# Patient Record
Sex: MALE | Race: OTHER | URBAN - METROPOLITAN AREA
[De-identification: names, ages, dates, MRNs, and addresses within clinical notes are randomized per-mention and may not be internally consistent; named-entity substitution may affect disease eponyms.]

---

## 2021-01-25 ENCOUNTER — INPATIENT (INPATIENT)
Facility: HOSPITAL | Age: 86
LOS: 3 days | Discharge: HOME | End: 2021-01-29
Attending: HOSPITALIST | Admitting: HOSPITALIST
Payer: MEDICARE

## 2021-01-25 VITALS
DIASTOLIC BLOOD PRESSURE: 59 MMHG | RESPIRATION RATE: 17 BRPM | OXYGEN SATURATION: 99 % | HEART RATE: 135 BPM | TEMPERATURE: 97 F | SYSTOLIC BLOOD PRESSURE: 120 MMHG

## 2021-01-25 LAB
ABO RH CONFIRMATION: SIGNIFICANT CHANGE UP
ALBUMIN SERPL ELPH-MCNC: 3.5 G/DL — SIGNIFICANT CHANGE UP (ref 3.5–5.2)
ALP SERPL-CCNC: 58 U/L — SIGNIFICANT CHANGE UP (ref 30–115)
ALT FLD-CCNC: 8 U/L — SIGNIFICANT CHANGE UP (ref 0–41)
ANION GAP SERPL CALC-SCNC: 21 MMOL/L — HIGH (ref 7–14)
ANISOCYTOSIS BLD QL: SIGNIFICANT CHANGE UP
AST SERPL-CCNC: 21 U/L — SIGNIFICANT CHANGE UP (ref 0–41)
BASOPHILS # BLD AUTO: 0 K/UL — SIGNIFICANT CHANGE UP (ref 0–0.2)
BASOPHILS NFR BLD AUTO: 0 % — SIGNIFICANT CHANGE UP (ref 0–1)
BILIRUB SERPL-MCNC: 0.2 MG/DL — SIGNIFICANT CHANGE UP (ref 0.2–1.2)
BLD GP AB SCN SERPL QL: SIGNIFICANT CHANGE UP
BUN SERPL-MCNC: 63 MG/DL — CRITICAL HIGH (ref 10–20)
CALCIUM SERPL-MCNC: 8.7 MG/DL — SIGNIFICANT CHANGE UP (ref 8.5–10.1)
CHLORIDE SERPL-SCNC: 107 MMOL/L — SIGNIFICANT CHANGE UP (ref 98–110)
CO2 SERPL-SCNC: 15 MMOL/L — LOW (ref 17–32)
CREAT SERPL-MCNC: 2.2 MG/DL — HIGH (ref 0.7–1.5)
EOSINOPHIL # BLD AUTO: 0.14 K/UL — SIGNIFICANT CHANGE UP (ref 0–0.7)
EOSINOPHIL NFR BLD AUTO: 0.8 % — SIGNIFICANT CHANGE UP (ref 0–8)
GAS PNL BLDA: SIGNIFICANT CHANGE UP
GIANT PLATELETS BLD QL SMEAR: PRESENT — SIGNIFICANT CHANGE UP
GLUCOSE SERPL-MCNC: 226 MG/DL — HIGH (ref 70–99)
HCT VFR BLD CALC: 14.1 % — LOW (ref 42–52)
HGB BLD-MCNC: 4.2 G/DL — CRITICAL LOW (ref 14–18)
HYPOCHROMIA BLD QL: SLIGHT — SIGNIFICANT CHANGE UP
LYMPHOCYTES # BLD AUTO: 1.55 K/UL — SIGNIFICANT CHANGE UP (ref 1.2–3.4)
LYMPHOCYTES # BLD AUTO: 8.7 % — LOW (ref 20.5–51.1)
MACROCYTES BLD QL: SIGNIFICANT CHANGE UP
MANUAL SMEAR VERIFICATION: SIGNIFICANT CHANGE UP
MCHC RBC-ENTMCNC: 24.6 PG — LOW (ref 27–31)
MCHC RBC-ENTMCNC: 29.8 G/DL — LOW (ref 32–37)
MCV RBC AUTO: 82.5 FL — SIGNIFICANT CHANGE UP (ref 80–94)
MONOCYTES # BLD AUTO: 0.71 K/UL — HIGH (ref 0.1–0.6)
MONOCYTES NFR BLD AUTO: 4 % — SIGNIFICANT CHANGE UP (ref 1.7–9.3)
NEUTROPHILS # BLD AUTO: 15.27 K/UL — HIGH (ref 1.4–6.5)
NEUTROPHILS NFR BLD AUTO: 85.7 % — HIGH (ref 42.2–75.2)
NT-PROBNP SERPL-SCNC: 3274 PG/ML — HIGH (ref 0–300)
PLAT MORPH BLD: NORMAL — SIGNIFICANT CHANGE UP
PLATELET # BLD AUTO: 249 K/UL — SIGNIFICANT CHANGE UP (ref 130–400)
POIKILOCYTOSIS BLD QL AUTO: SIGNIFICANT CHANGE UP
POLYCHROMASIA BLD QL SMEAR: SIGNIFICANT CHANGE UP
POTASSIUM SERPL-MCNC: 4.5 MMOL/L — SIGNIFICANT CHANGE UP (ref 3.5–5)
POTASSIUM SERPL-SCNC: 4.5 MMOL/L — SIGNIFICANT CHANGE UP (ref 3.5–5)
PROT SERPL-MCNC: 5.2 G/DL — LOW (ref 6–8)
RAPID RVP RESULT: SIGNIFICANT CHANGE UP
RBC # BLD: 1.71 M/UL — LOW (ref 4.7–6.1)
RBC # FLD: 19.6 % — HIGH (ref 11.5–14.5)
RBC BLD AUTO: SIGNIFICANT CHANGE UP
SARS-COV-2 RNA SPEC QL NAA+PROBE: SIGNIFICANT CHANGE UP
SODIUM SERPL-SCNC: 143 MMOL/L — SIGNIFICANT CHANGE UP (ref 135–146)
TROPONIN T SERPL-MCNC: 0.05 NG/ML — CRITICAL HIGH
VARIANT LYMPHS # BLD: 0.8 % — SIGNIFICANT CHANGE UP (ref 0–5)
WBC # BLD: 17.82 K/UL — HIGH (ref 4.8–10.8)
WBC # FLD AUTO: 17.82 K/UL — HIGH (ref 4.8–10.8)

## 2021-01-25 PROCEDURE — 76937 US GUIDE VASCULAR ACCESS: CPT | Mod: 26,GC

## 2021-01-25 PROCEDURE — 36000 PLACE NEEDLE IN VEIN: CPT | Mod: GC

## 2021-01-25 PROCEDURE — 93010 ELECTROCARDIOGRAM REPORT: CPT

## 2021-01-25 PROCEDURE — 71045 X-RAY EXAM CHEST 1 VIEW: CPT | Mod: 26

## 2021-01-25 PROCEDURE — 99285 EMERGENCY DEPT VISIT HI MDM: CPT | Mod: 25,GC

## 2021-01-25 RX ORDER — SODIUM CHLORIDE 9 MG/ML
1000 INJECTION, SOLUTION INTRAVENOUS ONCE
Refills: 0 | Status: COMPLETED | OUTPATIENT
Start: 2021-01-25 | End: 2021-01-25

## 2021-01-25 RX ADMIN — SODIUM CHLORIDE 1000 MILLILITER(S): 9 INJECTION, SOLUTION INTRAVENOUS at 16:20

## 2021-01-25 NOTE — ED PROVIDER NOTE - OBJECTIVE STATEMENT
Pt is a 88 yo M with PMH DM, HTN who was BIBEMS for reported SOB.  Per EMS, pt hypoxic 80s.  Non rebreather 80s. EMS was called by family who could not get in contact with him and would not open the door.  Pt lives alone.  Daughter Jocelyn said she last had a conversation with him 5 days ago.  He may be more confused now but pt baseline A&Ox 2. Pt denies any chest pain, headache, abdominal pain, n/v/d, BRBPR, melena, hematemesis, cough.

## 2021-01-25 NOTE — ED PROVIDER NOTE - ATTENDING CONTRIBUTION TO CARE
89 yo M 89 yo M bibems, found hypoxic to 80's. call was for SOB.  lives home alone.    alert, but expressing himself. cannot state why he is here, dec BS bilsarath, abd soft.  a/p: labs, imaging, reassess

## 2021-01-25 NOTE — ED PROVIDER NOTE - NS ED ROS FT
Review of Systems:  CONSTITUTIONAL: No fever, No diaphoresis  SKIN: No rash  HEMATOLOGIC: No abnormal bleeding or bruising  ENT: No sore throat, No neck pain  RESPIRATORY: + shortness of breath, No cough  CARDIAC: No chest pain, No palpitations  GI: No abdominal pain, No nausea, No vomiting, No diarrhea, No constipation, No bright red blood per rectum or melena  MUSCULOSKELETAL: No joint paint, No swelling, No back pain  NEUROLOGIC: No numbness, No focal weakness, No headache, No dizziness  All other systems negative, unless specified in HPI

## 2021-01-25 NOTE — ED PROVIDER NOTE - PROGRESS NOTE DETAILS
Authored by Dr. Jamison: blood gas noted. low h/h, transfuse Authored by Dr. Jamison: s/o to dr gilbert, f/u labs, imaging  and dispo AH - spoke to daughter Jocelyn [phone], for collateral, see HPI pt with profound anemia, family notified, 1 large dark BM in ED  bp and HR unchanged, pt had CT head and CT abd, awaiting result and admission  blood transfusion initiated. Pt resting comfortably in stretcher. Cont to have tachycardia. Blood transfusion started. Will cont to monitor. Discussed with GI. Planned to scope in AM. Janay todd admitted for further management and care

## 2021-01-25 NOTE — ED ADULT TRIAGE NOTE - CHIEF COMPLAINT QUOTE
Pt BIBA complaining of abdominal pain. As per EMS hypoxic on scene. 02 sat 78 on room air. 02 sat in triage 99. Pt bradycardic in triage, HR 35. ekg done in triage, Pt BIBA complaining of abdominal pain. As per EMS hypoxic on scene. 02 sat 78 on room air. 02 sat in triage 99. EKG done in triage.SVT noted.

## 2021-01-25 NOTE — ED ADULT TRIAGE NOTE - NS ED NURSE AMBULANCES
----- Message from Neha Bravo MD sent at 3/27/2017  4:50 PM CDT -----  Please inform flu test is neg   [facility]

## 2021-01-25 NOTE — ED ADULT NURSE NOTE - CHIEF COMPLAINT QUOTE
Pt BIBA complaining of abdominal pain. As per EMS hypoxic on scene. 02 sat 78 on room air. 02 sat in triage 99. EKG done in triage.SVT noted.

## 2021-01-25 NOTE — ED PROVIDER NOTE - PHYSICAL EXAMINATION
CONSTITUTIONAL: in no acute distress  SKIN: Warm, dry  EYES: No conjunctival injection. EOMI  ENT: airway clear  CARD:  tachycardic, regular  RESP: mild crackles bases b/l  ABD: Soft NTND  RECTAL: nontender, no masses or fissures, no blood  EXT: Normal ROM. No edema  NEURO: A&O x1, grossly unremarkable, no focal deficits

## 2021-01-26 LAB
ALBUMIN SERPL ELPH-MCNC: 3.4 G/DL — LOW (ref 3.5–5.2)
ALP SERPL-CCNC: 60 U/L — SIGNIFICANT CHANGE UP (ref 30–115)
ALT FLD-CCNC: 9 U/L — SIGNIFICANT CHANGE UP (ref 0–41)
ANION GAP SERPL CALC-SCNC: 10 MMOL/L — SIGNIFICANT CHANGE UP (ref 7–14)
APTT BLD: 23.8 SEC — CRITICAL LOW (ref 27–39.2)
APTT BLD: 24.3 SEC — LOW (ref 27–39.2)
AST SERPL-CCNC: 20 U/L — SIGNIFICANT CHANGE UP (ref 0–41)
BASOPHILS # BLD AUTO: 0.03 K/UL — SIGNIFICANT CHANGE UP (ref 0–0.2)
BASOPHILS NFR BLD AUTO: 0.2 % — SIGNIFICANT CHANGE UP (ref 0–1)
BILIRUB SERPL-MCNC: 1.4 MG/DL — HIGH (ref 0.2–1.2)
BUN SERPL-MCNC: 49 MG/DL — HIGH (ref 10–20)
CALCIUM SERPL-MCNC: 8.8 MG/DL — SIGNIFICANT CHANGE UP (ref 8.5–10.1)
CHLORIDE SERPL-SCNC: 112 MMOL/L — HIGH (ref 98–110)
CO2 SERPL-SCNC: 23 MMOL/L — SIGNIFICANT CHANGE UP (ref 17–32)
CREAT SERPL-MCNC: 1.9 MG/DL — HIGH (ref 0.7–1.5)
EOSINOPHIL # BLD AUTO: 0.04 K/UL — SIGNIFICANT CHANGE UP (ref 0–0.7)
EOSINOPHIL NFR BLD AUTO: 0.3 % — SIGNIFICANT CHANGE UP (ref 0–8)
GLUCOSE BLDC GLUCOMTR-MCNC: 126 MG/DL — HIGH (ref 70–99)
GLUCOSE BLDC GLUCOMTR-MCNC: 149 MG/DL — HIGH (ref 70–99)
GLUCOSE BLDC GLUCOMTR-MCNC: 149 MG/DL — HIGH (ref 70–99)
GLUCOSE BLDC GLUCOMTR-MCNC: 157 MG/DL — HIGH (ref 70–99)
GLUCOSE BLDC GLUCOMTR-MCNC: 160 MG/DL — HIGH (ref 70–99)
GLUCOSE SERPL-MCNC: 127 MG/DL — HIGH (ref 70–99)
HCT VFR BLD CALC: 22 % — LOW (ref 42–52)
HCT VFR BLD CALC: 22.3 % — LOW (ref 42–52)
HGB BLD-MCNC: 7.5 G/DL — LOW (ref 14–18)
HGB BLD-MCNC: 7.5 G/DL — LOW (ref 14–18)
IMM GRANULOCYTES NFR BLD AUTO: 2.1 % — HIGH (ref 0.1–0.3)
INR BLD: 0.97 RATIO — SIGNIFICANT CHANGE UP (ref 0.65–1.3)
INR BLD: 1 RATIO — SIGNIFICANT CHANGE UP (ref 0.65–1.3)
LACTATE SERPL-SCNC: 1.8 MMOL/L — SIGNIFICANT CHANGE UP (ref 0.7–2)
LYMPHOCYTES # BLD AUTO: 0.52 K/UL — LOW (ref 1.2–3.4)
LYMPHOCYTES # BLD AUTO: 3.4 % — LOW (ref 20.5–51.1)
MAGNESIUM SERPL-MCNC: 2.3 MG/DL — SIGNIFICANT CHANGE UP (ref 1.8–2.4)
MCHC RBC-ENTMCNC: 27.5 PG — SIGNIFICANT CHANGE UP (ref 27–31)
MCHC RBC-ENTMCNC: 27.7 PG — SIGNIFICANT CHANGE UP (ref 27–31)
MCHC RBC-ENTMCNC: 33.6 G/DL — SIGNIFICANT CHANGE UP (ref 32–37)
MCHC RBC-ENTMCNC: 34.1 G/DL — SIGNIFICANT CHANGE UP (ref 32–37)
MCV RBC AUTO: 81.2 FL — SIGNIFICANT CHANGE UP (ref 80–94)
MCV RBC AUTO: 81.7 FL — SIGNIFICANT CHANGE UP (ref 80–94)
MONOCYTES # BLD AUTO: 1.78 K/UL — HIGH (ref 0.1–0.6)
MONOCYTES NFR BLD AUTO: 11.5 % — HIGH (ref 1.7–9.3)
NEUTROPHILS # BLD AUTO: 12.81 K/UL — HIGH (ref 1.4–6.5)
NEUTROPHILS NFR BLD AUTO: 82.5 % — HIGH (ref 42.2–75.2)
NRBC # BLD: 8 /100 WBCS — HIGH (ref 0–0)
NRBC # BLD: 9 /100 WBCS — HIGH (ref 0–0)
PLATELET # BLD AUTO: 239 K/UL — SIGNIFICANT CHANGE UP (ref 130–400)
PLATELET # BLD AUTO: 246 K/UL — SIGNIFICANT CHANGE UP (ref 130–400)
POTASSIUM SERPL-MCNC: 3.9 MMOL/L — SIGNIFICANT CHANGE UP (ref 3.5–5)
POTASSIUM SERPL-SCNC: 3.9 MMOL/L — SIGNIFICANT CHANGE UP (ref 3.5–5)
PROT SERPL-MCNC: 5.2 G/DL — LOW (ref 6–8)
PROTHROM AB SERPL-ACNC: 11.1 SEC — SIGNIFICANT CHANGE UP (ref 9.95–12.87)
PROTHROM AB SERPL-ACNC: 11.5 SEC — SIGNIFICANT CHANGE UP (ref 9.95–12.87)
RBC # BLD: 2.71 M/UL — LOW (ref 4.7–6.1)
RBC # BLD: 2.73 M/UL — LOW (ref 4.7–6.1)
RBC # FLD: 16.5 % — HIGH (ref 11.5–14.5)
RBC # FLD: 16.6 % — HIGH (ref 11.5–14.5)
SODIUM SERPL-SCNC: 145 MMOL/L — SIGNIFICANT CHANGE UP (ref 135–146)
TROPONIN T SERPL-MCNC: 0.07 NG/ML — CRITICAL HIGH
WBC # BLD: 15.51 K/UL — HIGH (ref 4.8–10.8)
WBC # BLD: 15.84 K/UL — HIGH (ref 4.8–10.8)
WBC # FLD AUTO: 15.51 K/UL — HIGH (ref 4.8–10.8)
WBC # FLD AUTO: 15.84 K/UL — HIGH (ref 4.8–10.8)

## 2021-01-26 PROCEDURE — 43255 EGD CONTROL BLEEDING ANY: CPT

## 2021-01-26 PROCEDURE — 99223 1ST HOSP IP/OBS HIGH 75: CPT | Mod: 25

## 2021-01-26 PROCEDURE — 74177 CT ABD & PELVIS W/CONTRAST: CPT | Mod: 26

## 2021-01-26 PROCEDURE — 71275 CT ANGIOGRAPHY CHEST: CPT | Mod: 26

## 2021-01-26 PROCEDURE — 70450 CT HEAD/BRAIN W/O DYE: CPT | Mod: 26

## 2021-01-26 RX ORDER — MORPHINE SULFATE 50 MG/1
4 CAPSULE, EXTENDED RELEASE ORAL ONCE
Refills: 0 | Status: DISCONTINUED | OUTPATIENT
Start: 2021-01-26 | End: 2021-01-26

## 2021-01-26 RX ORDER — PANTOPRAZOLE SODIUM 20 MG/1
80 TABLET, DELAYED RELEASE ORAL ONCE
Refills: 0 | Status: COMPLETED | OUTPATIENT
Start: 2021-01-26 | End: 2021-01-26

## 2021-01-26 RX ORDER — PANTOPRAZOLE SODIUM 20 MG/1
8 TABLET, DELAYED RELEASE ORAL
Qty: 80 | Refills: 0 | Status: DISCONTINUED | OUTPATIENT
Start: 2021-01-26 | End: 2021-01-27

## 2021-01-26 RX ORDER — CHLORHEXIDINE GLUCONATE 213 G/1000ML
1 SOLUTION TOPICAL
Refills: 0 | Status: DISCONTINUED | OUTPATIENT
Start: 2021-01-26 | End: 2021-01-29

## 2021-01-26 RX ADMIN — PANTOPRAZOLE SODIUM 10 MG/HR: 20 TABLET, DELAYED RELEASE ORAL at 02:54

## 2021-01-26 RX ADMIN — MORPHINE SULFATE 4 MILLIGRAM(S): 50 CAPSULE, EXTENDED RELEASE ORAL at 02:22

## 2021-01-26 RX ADMIN — CHLORHEXIDINE GLUCONATE 1 APPLICATION(S): 213 SOLUTION TOPICAL at 05:58

## 2021-01-26 RX ADMIN — PANTOPRAZOLE SODIUM 80 MILLIGRAM(S): 20 TABLET, DELAYED RELEASE ORAL at 02:55

## 2021-01-26 RX ADMIN — PANTOPRAZOLE SODIUM 10 MG/HR: 20 TABLET, DELAYED RELEASE ORAL at 13:27

## 2021-01-26 NOTE — CONSULT NOTE ADULT - ATTENDING COMMENTS
89 yr old patient with melena and drop in HB. Will need EGd in view of significant drop in HB Risks and Benefits explained to patient and daughter. Agree to proceed with EGD. DNR/DNi to be rescinded for procedure.

## 2021-01-26 NOTE — CHART NOTE - NSCHARTNOTEFT_GEN_A_CORE
Patient admitted earlier today. Seen and evaluated, hemodynamically stable. GI consulted and recommendations noted. Will continue to monitor CBC, troponin and monitor vitals.     Dr Rosa  [phone]

## 2021-01-26 NOTE — H&P ADULT - HISTORY OF PRESENT ILLNESS
89 year old male with PMHx of Peptic ulcer disease (>60 years ago), HTN, DM2 (not on any meds), iron deficiency anemia sent in due to hypoxia. Patient  89 year old male with PMHx of Peptic ulcer disease (>60 years ago), HTN, DM2 (not on any meds), iron deficiency anemia sent in due to hypoxia. Patient lives alone, daughter last reached out to him Friday last, could not reach him since, so today told friend to check on him, they got to the house, patient was too weak could not open door chain, so they called EMS. EMS found patient to be hypoxic to the 80s, he was complaining of feeling weak.   ROS positive for abdominal discomfort patient has been having for a few week, was scheduled for Endoscopy at [facility] this Saturday. Denies fever, chills, blood in stools, emesis.  In ED patient stable on nasal cannula, tachy to 130s. Found to have Hbg 4.2. TEDDY negative, but then patient had melanotic bowel movement.

## 2021-01-26 NOTE — H&P ADULT - NSHPPHYSICALEXAM_GEN_ALL_CORE
PHYSICAL EXAM:T(C): 36.6 (01-25-21 @ 22:30), Max: 36.6 (01-25-21 @ 22:30)  HR: 120 (01-25-21 @ 22:30) (35 - 135)  BP: 146/84 (01-25-21 @ 22:30) (115/75 - 146/84)  RR: 18 (01-25-21 @ 22:30) (17 - 18)  SpO2: 99% (01-25-21 @ 22:30) (99% - 99%)  GENERAL: NAD, well-developed  HEAD:  Atraumatic, Normocephalic  EYES: EOMI, PERRLA, conjunctiva and sclera clear  NECK: Supple, No JVD  CHEST/LUNG: Clear to auscultation bilaterally; No wheeze  HEART: Regular rate and rhythm; No murmurs, rubs, or gallops  ABDOMEN: Soft, Nontender, Nondistended; Bowel sounds present  EXTREMITIES:  2+ Peripheral Pulses, No clubbing, cyanosis, or edema  PSYCH: AAOx2, not oriented to date  NEUROLOGY: non-focal  SKIN: No rashes or lesions

## 2021-01-26 NOTE — GOALS OF CARE CONVERSATION - ADVANCED CARE PLANNING - CONVERSATION DETAILS
Discussed with daughter Liv goals of care. She is aware patient's condition is critical currently. She wishes for treatment options to be pursed, but does not want resuscitation and intubation if patient gets to that point.

## 2021-01-26 NOTE — H&P ADULT - NSHPLABSRESULTS_GEN_ALL_CORE
4.2    17.82 )-----------( 249      ( 25 Jan 2021 16:33 )             14.1       01-25    143  |  107  |  63<HH>  ----------------------------<  226<H>  4.5   |  15<L>  |  2.2<H>    Ca    8.7      25 Jan 2021 16:33    TPro  5.2<L>  /  Alb  3.5  /  TBili  0.2  /  DBili  x   /  AST  21  /  ALT  8   /  AlkPhos  58  01-25      < from: CT Angio Chest PE Protocol w/ IV Cont (01.26.21 @ 00:33) >      IMPRESSION:    Chest:  No evidence for acute pulmonary embolus.  No CT evidence of pneumonia.  Mild areas of interlobular septal thickening may reflect mild pulmonary edema.  Upper lobe predominant centrilobular emphysema.    Abdomen:  Questionable small outpouching at the proximal duodenum with wall thickening versus volume averaging with adjacent motion artifact. Small inflamed duodenal ulcer is not excluded.  Atrophic left kidney with heterogeneous enhancement and mild fullness of the distal left ureter. Please correlate with urinalysis to exclude left-sided urinary infection/pyelonephritis.    < end of copied text >

## 2021-01-26 NOTE — CONSULT NOTE ADULT - ASSESSMENT
89 year old male with PMHx of Peptic ulcer disease (>60 years ago), HTN, DM2 (not on any meds), iron deficiency anemia? sent in due to hypoxia. Patient lives alone, daughter last reached out to him Friday, could not reach him since, so today told friend to check on him, they got to the house, patient was too weak could not open door chain, so they called EMS. EMS found patient to be hypoxic to the 80s, he was complaining of feeling weak. patient has been complaining of melena for 2 weeks, twice a day loose black stool. also complains of mild abdominal pain, burning in type. patient denies hematemesis nausea or vomiting.   patient reports having EGD in the past and was diagnosed with PUD? he is on PPI at home.    *Melena and acute blood loss anemia  -In ED patient tachy to 130s, BP 59/35 on admission currently tachycardic 107, /79  -Hbg 4.2 MCV 82.5 ufp=509  BUN=63 Cr=2.2, no baseline labs  -TEDDY + melena  -s/p 3 units PRBCs    Plan  -PPI drip  -2 large bore IV  -Repeat CBC STAT, check INR/PTT. target Hb 8 for procedure  -keep active type and screen  -NPO  -EGD, timing will depend on repeat labs

## 2021-01-26 NOTE — CONSULT NOTE ADULT - SUBJECTIVE AND OBJECTIVE BOX
Gastroenterology Consultation:    Patient is a 89y old  Male who presents with a chief complaint of Hypoxia (26 Jan 2021 02:04)      Admitted on: 01-26-21  HPI:  89 year old male with PMHx of Peptic ulcer disease (>60 years ago), HTN, DM2 (not on any meds), iron deficiency anemia sent in due to hypoxia. Patient lives alone, daughter last reached out to him Friday, could not reach him since, so today told friend to check on him, they got to the house, patient was too weak could not open door chain, so they called EMS. EMS found patient to be hypoxic to the 80s, he was complaining of feeling weak.   ROS positive for abdominal discomfort patient has been having for a few week, was scheduled for Endoscopy at [facility] this Saturday.    In ED patient tachy to 130s, BP 59/35 sat=99% on NC  Found to have Hbg 4.2 MCV 82.5 rig=110  BUN=63 Cr=2.2  LFts normal  TEDDY showed no blood in the ED,  1 large dark BM in ED       Prior records Reviewed (Y/N):  History obtained from person other than patient (Y/N):    Prior EGD:  Prior Colonoscopy:      PAST MEDICAL & SURGICAL HISTORY:  Peptic ulcer    Type 2 diabetes mellitus    Hypertension    FAMILY HISTORY:      Social History:  Tobacco:  Alcohol:  Drugs:    Home Medications:  ferrous sulfate 325 mg (65 mg elemental iron) oral delayed release tablet: 1 tab(s) orally once a day (26 Jan 2021 01:59)    MEDICATIONS  (STANDING):  chlorhexidine 4% Liquid 1 Application(s) Topical <User Schedule>  pantoprazole Infusion 8 mG/Hr (10 mL/Hr) IV Continuous <Continuous>    MEDICATIONS  (PRN):      Allergies  No Known Allergies      Review of Systems:     Physical Examination:  T(C): 37.2 (01-26-21 @ 06:00), Max: 37.2 (01-26-21 @ 06:00)  HR: 107 (01-26-21 @ 06:00) (35 - 135)  BP: 159/79 (01-26-21 @ 06:00) (115/75 - 159/79)  RR: 18 (01-26-21 @ 06:00) (17 - 18)  SpO2: 100% (01-26-21 @ 03:39) (99% - 100%)  Height (cm): 172.7 (01-26-21 @ 06:00)  Weight (kg): 66.1 (01-26-21 @ 06:00)    01-25-21 @ 07:01  -  01-26-21 @ 07:00  --------------------------------------------------------  IN: 20 mL / OUT: 0 mL / NET: 20 mL        Data: (reviewed by attending)                        4.2    17.82 )-----------( 249      ( 25 Jan 2021 16:33 )             14.1     Hgb Trend:  4.2  01-25-21 @ 16:33        01-25    143  |  107  |  63<HH>  ----------------------------<  226<H>  4.5   |  15<L>  |  2.2<H>    Ca    8.7      25 Jan 2021 16:33    TPro  5.2<L>  /  Alb  3.5  /  TBili  0.2  /  DBili  x   /  AST  21  /  ALT  8   /  AlkPhos  58  01-25    Liver panel trend:  TBili 0.2   /   AST 21   /   ALT 8   /   AlkP 58   /   Tptn 5.2   /   Alb 3.5    /   DBili --      01-25              Radiology:(reviewed by attending)  CT Abdomen and Pelvis w/ IV Cont:   EXAM:  CT ABDOMEN AND PELVIS IC        EXAM:  CT ANGIO CHEST PE PROTOCOL IC            PROCEDURE DATE:  01/26/2021            INTERPRETATION:  CLINICAL HISTORY/REASON FOR EXAM: Tachycardia.    TECHNIQUE: Contiguous axial CT images were obtained from the thoracic inlet to the pubic symphysis following administration of Omnipaque 350 intravenous contrast. Oral contrast was not administered. Reformatted images in the coronal and sagittal planes were acquired. 3D (MIP) images obtained.    COMPARISON: None.      FINDINGS:    CHEST:    PULMONARY EMBOLUS: No pulmonary emboli.    LUNGS, PLEURA, AIRWAYS: Upper lobe predominant emphysema. Bibasilar subsegmental atelectasis. Mild areas of interlobular septal thickening/subpleural reticulation. No lobar consolidation, pleural effusion, or pneumothorax. No evidence of central endobronchial obstruction.    THORACIC NODES: No mediastinal, hilar, supraclavicular, or axillary lymphadenopathy.    MEDIASTINUM/GREAT VESSELS: Heart size appears enlarged. Nopericardial effusion. Ascending thoracic aorta measures upper limits of normal. Aortic valvular, coronary artery and thoracic aortic calcifications.    ABDOMEN/PELVIS:    HEPATOBILIARY: Post cholecystectomy.    SPLEEN: Atrophic.    PANCREAS: Unremarkable.    ADRENAL GLANDS: Nonspecific left adrenal gland thickening.    KIDNEYS: Right renal cyst and bilateral subcentimeter hypodensities too small to characterize. No hydronephrosis. Atrophic left kidney with heterogeneous enhancement. Mild fullnessof the distal left ureter.    ABDOMINOPELVIC NODES: Unremarkable.    PELVIC ORGANS: Unremarkable.    PERITONEUM/MESENTERY/BOWEL: Sigmoid diverticulosis. No bowel obstruction, ascites or pneumoperitoneum. Normal appendix. Questionable small outpouching at the proximal duodenum with wall thickening versus volume averaging with adjacent motion artifact (series 8, image [id number])    BONES/SOFT TISSUES: Degenerative changes of the spine and bilateral hips. Diffuse osteopenia.    OTHER: Vascular calcifications.    IMPRESSION:    Chest:  No evidence for acute pulmonary embolus.  No CT evidence of pneumonia.  Mild areas of interlobular septal thickening may reflect mild pulmonary edema.  Upper lobe predominant centrilobular emphysema.    Abdomen:  Questionable small outpouching at the proximal duodenum with wall thickening versus volume averaging with adjacent motion artifact. Small inflamed duodenal ulcer is not excluded.  Atrophic left kidney with heterogeneous enhancement and mild fullness of the distal left ureter. Please correlate with urinalysis to exclude left-sided urinary infection/pyelonephritis.            GENA CAMPBELL M.D., RESIDENT RADIOLOGIST  This document has been electronically signed.  BRIANNE VAUGHN MD; Attending Radiologist  This document has been electronically signed. Jan 26 2021  1:45AM (01-26-21 @ 00:31)      < from: CT Head No Cont (01.26.21 @ 00:28) >    EXAM:  CT BRAIN            PROCEDURE DATE:  01/26/2021            INTERPRETATION:  Clinical History / Reason for exam: Altered mental status.    Technique: Noncontrast head CT.  Contiguous unenhanced CT axial images of the head from the base to thevertex with coronal and sagittal reformats.    Comparison: None.    Findings:    The ventricles and cortical sulci are prominent, consistent with parenchymal volume loss.    There are patchy hypodensities throughout the hemispheric white matter without mass effect compatible with chronic microvascular changes.    There is no acute territorial infarct, intracranial hemorrhage, extra-axial fluid collection or midline shift.    Calvarium is intact. The visualized paranasal sinuses and mastoids are well-aerated.    IMPRESSION:    No evidence of acute intracranial hemorrhage, mass effect or midline shift.    Moderate chronic microvascular ischemic changes.            GENA CAMPBELL M.D., RESIDENT RADIOLOGIST  This document has been electronically signed.  BRIANNE VAUGHN MD; Attending Radiologist  This document has been electronically signed. Jan 26 2021 12:51AM    < end of copied text >     Gastroenterology Consultation:    Patient is a 89y old  Male who presents with a chief complaint of Hypoxia (26 Jan 2021 02:04)      Admitted on: 01-26-21  HPI:  89 year old male with PMHx of Peptic ulcer disease (>60 years ago), HTN, DM2 (not on any meds), iron deficiency anemia? sent in due to hypoxia. Patient lives alone, daughter last reached out to him Friday, could not reach him since, so today told friend to check on him, they got to the house, patient was too weak could not open door chain, so they called EMS. EMS found patient to be hypoxic to the 80s, he was complaining of feeling weak. patient has been complaining of melena for 2 weeks, twice a day loose black stool. also complains of mild abdominal pain, burning in type. patient denies hematemesis nausea or vomiting.   patient reports having EGD in the past and was diagnosed with PUD? he is on PPI at home.    In ED patient tachy to 130s, BP 59/35 sat=99% on NC  Found to have Hbg 4.2 MCV 82.5 jch=700  BUN=63 Cr=2.2  LFts normal  1 large dark BM in ED     Prior records Reviewed (Y/N): Y  History obtained from person other than patient (Y/N):N    [facility], [facility]   Prior Colonoscopy: years back normal per the patient       PAST MEDICAL & SURGICAL HISTORY:  Peptic ulcer  Type 2 diabetes mellitus  Hypertension    FAMILY HISTORY: gastric ulcer in brother       Social History:  Tobacco: former smoker, stopped 40 years ago   Alcohol: occasionally, 0.5 glass of wine   Drugs: N    Home Medications:  ferrous sulfate 325 mg (65 mg elemental iron) oral delayed release tablet: 1 tab(s) orally once a day (26 Jan 2021 01:59)    MEDICATIONS  (STANDING):  chlorhexidine 4% Liquid 1 Application(s) Topical <User Schedule>  pantoprazole Infusion 8 mG/Hr (10 mL/Hr) IV Continuous <Continuous>    MEDICATIONS  (PRN):      Allergies  No Known Allergies      Review of Systems:   CONSTITUTIONAL: +weakness, no fevers or chills  EYES/ENT: No visual changes;  No vertigo or throat pain   NECK: No pain or stiffness  RESPIRATORY: No cough, wheezing, hemoptysis; No shortness of breath  CARDIOVASCULAR: No chest pain or palpitations  GASTROINTESTINAL: per HPI  GENITOURINARY: No dysuria, frequency or hematuria  SKIN: No itching, rashes    Physical Examination:  T(C): 37.2 (01-26-21 @ 06:00), Max: 37.2 (01-26-21 @ 06:00)  HR: 107 (01-26-21 @ 06:00) (35 - 135)  BP: 159/79 (01-26-21 @ 06:00) (115/75 - 159/79)  RR: 18 (01-26-21 @ 06:00) (17 - 18)  SpO2: 100% (01-26-21 @ 03:39) (99% - 100%)  Height (cm): 172.7 (01-26-21 @ 06:00)  Weight (kg): 66.1 (01-26-21 @ 06:00)    01-25-21 @ 07:01  -  01-26-21 @ 07:00  --------------------------------------------------------  IN: 20 mL / OUT: 0 mL / NET: 20 mL    GENERAL: NAD, speaks in full sentences, no signs of respiratory distress  HEAD:  Atraumatic, Normocephalic  EYES: EOMI, PERRLA   NECK: Supple, No JVD  CHEST/LUNG: Clear to auscultation bilaterally; No wheeze; No crackles; No accessory muscles used  HEART: Regular rate and rhythm; No murmurs;   ABDOMEN: Soft, Nontender, Nondistended; Bowel sounds present; No guarding. TEDDY +melena  NEUROLOGY: non-focal  SKIN: No rashes or lesions      Data: (reviewed by attending)                        4.2    17.82 )-----------( 249      ( 25 Jan 2021 16:33 )             14.1     Hgb Trend:  4.2  01-25-21 @ 16:33        01-25    143  |  107  |  63<HH>  ----------------------------<  226<H>  4.5   |  15<L>  |  2.2<H>    Ca    8.7      25 Jan 2021 16:33    TPro  5.2<L>  /  Alb  3.5  /  TBili  0.2  /  DBili  x   /  AST  21  /  ALT  8   /  AlkPhos  58  01-25    Liver panel trend:  TBili 0.2   /   AST 21   /   ALT 8   /   AlkP 58   /   Tptn 5.2   /   Alb 3.5    /   DBili --      01-25              Radiology:(reviewed by attending)  CT Abdomen and Pelvis w/ IV Cont:   EXAM:  CT ABDOMEN AND PELVIS IC        EXAM:  CT ANGIO CHEST PE PROTOCOL IC            PROCEDURE DATE:  01/26/2021            INTERPRETATION:  CLINICAL HISTORY/REASON FOR EXAM: Tachycardia.    TECHNIQUE: Contiguous axial CT images were obtained from the thoracic inlet to the pubic symphysis following administration of Omnipaque 350 intravenous contrast. Oral contrast was not administered. Reformatted images in the coronal and sagittal planes were acquired. 3D (MIP) images obtained.    COMPARISON: None.      FINDINGS:    CHEST:    PULMONARY EMBOLUS: No pulmonary emboli.    LUNGS, PLEURA, AIRWAYS: Upper lobe predominant emphysema. Bibasilar subsegmental atelectasis. Mild areas of interlobular septal thickening/subpleural reticulation. No lobar consolidation, pleural effusion, or pneumothorax. No evidence of central endobronchial obstruction.    THORACIC NODES: No mediastinal, hilar, supraclavicular, or axillary lymphadenopathy.    MEDIASTINUM/GREAT VESSELS: Heart size appears enlarged. Nopericardial effusion. Ascending thoracic aorta measures upper limits of normal. Aortic valvular, coronary artery and thoracic aortic calcifications.    ABDOMEN/PELVIS:    HEPATOBILIARY: Post cholecystectomy.    SPLEEN: Atrophic.    PANCREAS: Unremarkable.    ADRENAL GLANDS: Nonspecific left adrenal gland thickening.    KIDNEYS: Right renal cyst and bilateral subcentimeter hypodensities too small to characterize. No hydronephrosis. Atrophic left kidney with heterogeneous enhancement. Mild fullnessof the distal left ureter.    ABDOMINOPELVIC NODES: Unremarkable.    PELVIC ORGANS: Unremarkable.    PERITONEUM/MESENTERY/BOWEL: Sigmoid diverticulosis. No bowel obstruction, ascites or pneumoperitoneum. Normal appendix. Questionable small outpouching at the proximal duodenum with wall thickening versus volume averaging with adjacent motion artifact (series 8, image [id number])    BONES/SOFT TISSUES: Degenerative changes of the spine and bilateral hips. Diffuse osteopenia.    OTHER: Vascular calcifications.    IMPRESSION:    Chest:  No evidence for acute pulmonary embolus.  No CT evidence of pneumonia.  Mild areas of interlobular septal thickening may reflect mild pulmonary edema.  Upper lobe predominant centrilobular emphysema.    Abdomen:  Questionable small outpouching at the proximal duodenum with wall thickening versus volume averaging with adjacent motion artifact. Small inflamed duodenal ulcer is not excluded.  Atrophic left kidney with heterogeneous enhancement and mild fullness of the distal left ureter. Please correlate with urinalysis to exclude left-sided urinary infection/pyelonephritis.            GENA CAMPBELL M.D., RESIDENT RADIOLOGIST  This document has been electronically signed.  BRIANNE VAUGHN MD; Attending Radiologist  This document has been electronically signed. Jan 26 2021  1:45AM (01-26-21 @ 00:31)      < from: CT Head No Cont (01.26.21 @ 00:28) >    EXAM:  CT BRAIN            PROCEDURE DATE:  01/26/2021            INTERPRETATION:  Clinical History / Reason for exam: Altered mental status.    Technique: Noncontrast head CT.  Contiguous unenhanced CT axial images of the head from the base to thevertex with coronal and sagittal reformats.    Comparison: None.    Findings:    The ventricles and cortical sulci are prominent, consistent with parenchymal volume loss.    There are patchy hypodensities throughout the hemispheric white matter without mass effect compatible with chronic microvascular changes.    There is no acute territorial infarct, intracranial hemorrhage, extra-axial fluid collection or midline shift.    Calvarium is intact. The visualized paranasal sinuses and mastoids are well-aerated.    IMPRESSION:    No evidence of acute intracranial hemorrhage, mass effect or midline shift.    Moderate chronic microvascular ischemic changes.            GENA CAMPBELL M.D., RESIDENT RADIOLOGIST  This document has been electronically signed.  BRIANNE VAUGHN MD; Attending Radiologist  This document has been electronically signed. Jan 26 2021 12:51AM    < end of copied text >

## 2021-01-26 NOTE — PRE-ANESTHESIA EVALUATION ADULT - NSANTHPEFT_GEN_ALL_CORE
HEENT: normal  Neuro: grossly intact  Chest: clear lungs, S1/S2 regular, no murmurs  Abdomen: non tender  Pulses: normal

## 2021-01-26 NOTE — CHART NOTE - NSCHARTNOTEFT_GEN_A_CORE
PACU ANESTHESIA ADMISSION NOTE      Procedure:   Post op diagnosis:      ____  Intubated  TV:______       Rate: ______      FiO2: ______    __x__  Patent Airway    __x__  Full return of protective reflexes    __x__  Full recovery from anesthesia / back to baseline     Vitals:   See Anesthesia record  T- 98.4 P- 88 R- 151/90 SpO2- 99% on RA    Mental Status:  __x__ Awake   ___x__ Alert   _____ Drowsy   _____ Sedated    Nausea/Vomiting:  __x__ NO  ______Yes,   See Post - Op Orders          Pain Scale (0-10):  __0___    Treatment: ____ None    ____ See Post - Op/PCA Orders    Post - Operative Fluids:   ____ Oral   __x__ See Post - Op Orders    Plan: Discharge:   ___Home       __x___Floor     _____Critical Care    _____  Other:_________________    Comments: No anesthesia complications/issues noted. Discharge to [facility] when criteria met.

## 2021-01-26 NOTE — H&P ADULT - ASSESSMENT
89 year old male with PMHx of Peptic ulcer disease (>60 years ago), HTN, DM2 (not on any meds), iron deficiency anemia sent in due to hypoxia.    #Acute blood loss anemia  #Acute hypoxemic respiratory failure likely secondary to above  Hbg 4.2 on admission  BUN/Cr 63/2.2   Receiving 3 unit PRBC  CT A/P with duodenal wall thickening suggestive of ulcer  Protonix drip  Maintain active T&S  GI aware of patient, will follow    #Sinus tachycardia  Appropriate response to anemia, monitor    #DM2  Monitor Fs    #HTN  Complete med rec once more stable    #DVT PPX: SCDs  #GI PPX: Protonix drip  #Dispo: From home, lives alone  #Activity: With assistance  #DNR/I, discussed with daughter     89 year old male with PMHx of Peptic ulcer disease (>60 years ago), HTN, DM2 (not on any meds), iron deficiency anemia sent in due to hypoxia.    #Acute blood loss anemia  #Acute hypoxemic respiratory failure likely secondary to above  Hbg 4.2 on admission  BUN/Cr 63/2.2   Receiving 3 unit PRBCs  CT A/P with duodenal wall thickening suggestive of ulcer  Protonix drip  Maintain active T&S  GI aware of patient, will follow    #Sinus tachycardia  Appropriate response to anemia, monitor    #ANISH vs CKD stage 4  Likely pre-renal secondary to anemia  Monitor Cr avoid nephrotoxins    #Elevated Troponin  Likely demand ischemia from tachycardia  Monitor Troponin    #DM2  Monitor Fs    #HTN  Complete med rec once more stable    #DVT PPX: SCDs  #GI PPX: Protonix drip  #Dispo: From home, lives alone  #Activity: With assistance  #DNR/I, discussed with daughter   #Prognosis guarded

## 2021-01-26 NOTE — ED ADULT NURSE REASSESSMENT NOTE - NS ED NURSE REASSESS COMMENT FT1
Pt admitted to SDU. Pt received all 3U PRBC's. Pt Alert and oriented. Pt VS stable. Pt moved to main 2B. Report given to ZEV Shay. PT just made DNR/DNI. MOLST formed filled out.

## 2021-01-27 LAB
A1C WITH ESTIMATED AVERAGE GLUCOSE RESULT: 5.6 % — SIGNIFICANT CHANGE UP (ref 4–5.6)
ALBUMIN SERPL ELPH-MCNC: 3.4 G/DL — LOW (ref 3.5–5.2)
ALP SERPL-CCNC: 62 U/L — SIGNIFICANT CHANGE UP (ref 30–115)
ALT FLD-CCNC: 10 U/L — SIGNIFICANT CHANGE UP (ref 0–41)
ANION GAP SERPL CALC-SCNC: 13 MMOL/L — SIGNIFICANT CHANGE UP (ref 7–14)
AST SERPL-CCNC: 27 U/L — SIGNIFICANT CHANGE UP (ref 0–41)
BASOPHILS # BLD AUTO: 0.04 K/UL — SIGNIFICANT CHANGE UP (ref 0–0.2)
BASOPHILS NFR BLD AUTO: 0.3 % — SIGNIFICANT CHANGE UP (ref 0–1)
BILIRUB SERPL-MCNC: 1.7 MG/DL — HIGH (ref 0.2–1.2)
BUN SERPL-MCNC: 32 MG/DL — HIGH (ref 10–20)
CALCIUM SERPL-MCNC: 8.8 MG/DL — SIGNIFICANT CHANGE UP (ref 8.5–10.1)
CHLORIDE SERPL-SCNC: 109 MMOL/L — SIGNIFICANT CHANGE UP (ref 98–110)
CO2 SERPL-SCNC: 22 MMOL/L — SIGNIFICANT CHANGE UP (ref 17–32)
CREAT SERPL-MCNC: 1.8 MG/DL — HIGH (ref 0.7–1.5)
EOSINOPHIL # BLD AUTO: 0.08 K/UL — SIGNIFICANT CHANGE UP (ref 0–0.7)
EOSINOPHIL NFR BLD AUTO: 0.5 % — SIGNIFICANT CHANGE UP (ref 0–8)
ESTIMATED AVERAGE GLUCOSE: 114 MG/DL — SIGNIFICANT CHANGE UP (ref 68–114)
GLUCOSE BLDC GLUCOMTR-MCNC: 131 MG/DL — HIGH (ref 70–99)
GLUCOSE BLDC GLUCOMTR-MCNC: 140 MG/DL — HIGH (ref 70–99)
GLUCOSE SERPL-MCNC: 116 MG/DL — HIGH (ref 70–99)
HCT VFR BLD CALC: 24.8 % — LOW (ref 42–52)
HCT VFR BLD CALC: 28.8 % — LOW (ref 42–52)
HGB BLD-MCNC: 8.6 G/DL — LOW (ref 14–18)
HGB BLD-MCNC: 9.3 G/DL — LOW (ref 14–18)
IMM GRANULOCYTES NFR BLD AUTO: 1.3 % — HIGH (ref 0.1–0.3)
LYMPHOCYTES # BLD AUTO: 0.34 K/UL — LOW (ref 1.2–3.4)
LYMPHOCYTES # BLD AUTO: 2.2 % — LOW (ref 20.5–51.1)
MAGNESIUM SERPL-MCNC: 2 MG/DL — SIGNIFICANT CHANGE UP (ref 1.8–2.4)
MCHC RBC-ENTMCNC: 26.7 PG — LOW (ref 27–31)
MCHC RBC-ENTMCNC: 27.8 PG — SIGNIFICANT CHANGE UP (ref 27–31)
MCHC RBC-ENTMCNC: 32.3 G/DL — SIGNIFICANT CHANGE UP (ref 32–37)
MCHC RBC-ENTMCNC: 34.7 G/DL — SIGNIFICANT CHANGE UP (ref 32–37)
MCV RBC AUTO: 80.3 FL — SIGNIFICANT CHANGE UP (ref 80–94)
MCV RBC AUTO: 82.8 FL — SIGNIFICANT CHANGE UP (ref 80–94)
MONOCYTES # BLD AUTO: 1.33 K/UL — HIGH (ref 0.1–0.6)
MONOCYTES NFR BLD AUTO: 8.5 % — SIGNIFICANT CHANGE UP (ref 1.7–9.3)
NEUTROPHILS # BLD AUTO: 13.72 K/UL — HIGH (ref 1.4–6.5)
NEUTROPHILS NFR BLD AUTO: 87.2 % — HIGH (ref 42.2–75.2)
NRBC # BLD: 12 /100 WBCS — HIGH (ref 0–0)
NRBC # BLD: 7 /100 WBCS — HIGH (ref 0–0)
PLATELET # BLD AUTO: 229 K/UL — SIGNIFICANT CHANGE UP (ref 130–400)
PLATELET # BLD AUTO: 249 K/UL — SIGNIFICANT CHANGE UP (ref 130–400)
POTASSIUM SERPL-MCNC: 4.1 MMOL/L — SIGNIFICANT CHANGE UP (ref 3.5–5)
POTASSIUM SERPL-SCNC: 4.1 MMOL/L — SIGNIFICANT CHANGE UP (ref 3.5–5)
PROT SERPL-MCNC: 5.2 G/DL — LOW (ref 6–8)
RBC # BLD: 3.09 M/UL — LOW (ref 4.7–6.1)
RBC # BLD: 3.48 M/UL — LOW (ref 4.7–6.1)
RBC # FLD: 15.9 % — HIGH (ref 11.5–14.5)
RBC # FLD: 17.4 % — HIGH (ref 11.5–14.5)
SODIUM SERPL-SCNC: 144 MMOL/L — SIGNIFICANT CHANGE UP (ref 135–146)
TROPONIN T SERPL-MCNC: 0.04 NG/ML — CRITICAL HIGH
TROPONIN T SERPL-MCNC: 0.06 NG/ML — CRITICAL HIGH
WBC # BLD: 14.16 K/UL — HIGH (ref 4.8–10.8)
WBC # BLD: 15.71 K/UL — HIGH (ref 4.8–10.8)
WBC # FLD AUTO: 14.16 K/UL — HIGH (ref 4.8–10.8)
WBC # FLD AUTO: 15.71 K/UL — HIGH (ref 4.8–10.8)

## 2021-01-27 PROCEDURE — 99233 SBSQ HOSP IP/OBS HIGH 50: CPT

## 2021-01-27 PROCEDURE — 93010 ELECTROCARDIOGRAM REPORT: CPT | Mod: 76

## 2021-01-27 RX ORDER — PANTOPRAZOLE SODIUM 20 MG/1
40 TABLET, DELAYED RELEASE ORAL
Refills: 0 | Status: DISCONTINUED | OUTPATIENT
Start: 2021-01-27 | End: 2021-01-27

## 2021-01-27 RX ORDER — METOPROLOL TARTRATE 50 MG
25 TABLET ORAL
Refills: 0 | Status: DISCONTINUED | OUTPATIENT
Start: 2021-01-27 | End: 2021-01-28

## 2021-01-27 RX ORDER — METOPROLOL TARTRATE 50 MG
25 TABLET ORAL
Refills: 0 | Status: DISCONTINUED | OUTPATIENT
Start: 2021-01-27 | End: 2021-01-27

## 2021-01-27 RX ORDER — METOPROLOL TARTRATE 50 MG
5 TABLET ORAL ONCE
Refills: 0 | Status: DISCONTINUED | OUTPATIENT
Start: 2021-01-27 | End: 2021-01-27

## 2021-01-27 RX ORDER — PANTOPRAZOLE SODIUM 20 MG/1
8 TABLET, DELAYED RELEASE ORAL
Qty: 80 | Refills: 0 | Status: DISCONTINUED | OUTPATIENT
Start: 2021-01-27 | End: 2021-01-28

## 2021-01-27 RX ADMIN — Medication 25 MILLIGRAM(S): at 10:44

## 2021-01-27 RX ADMIN — Medication 25 MILLIGRAM(S): at 17:22

## 2021-01-27 RX ADMIN — CHLORHEXIDINE GLUCONATE 1 APPLICATION(S): 213 SOLUTION TOPICAL at 05:22

## 2021-01-27 RX ADMIN — PANTOPRAZOLE SODIUM 10 MG/HR: 20 TABLET, DELAYED RELEASE ORAL at 11:07

## 2021-01-27 NOTE — PROGRESS NOTE ADULT - ASSESSMENT
89 year old male with PMHx of Peptic ulcer disease (>60 years ago), HTN, DM2 (not on any meds), iron deficiency anemia? sent in due to hypoxia. Patient lives alone, daughter last reached out to him Friday, could not reach him since, so today told friend to check on him, they got to the house, patient was too weak could not open door chain, so they called EMS. EMS found patient to be hypoxic to the 80s, he was complaining of feeling weak. patient has been complaining of melena for 2 weeks, twice a day loose black stool and abdominal pain. patient reports having EGD in the past and was diagnosed with PUD? he is on PPI at home.    In ED patient tachy to 130s, BP 59/35 on admission currently tachycardic 107, /79. Hbg 4.2 MCV 82.5 lpt=547  BUN=63 Cr=2.2, no baseline labs, TEDDY + melena, s/p 4 units PRBCs total.      A/P:   #Upper GI bleed   s/p EGD < from: EGD (01.26.21 @ 15:15) > Impressions:  Normal mucosa in the whole esophagus.  Erosions in the antrum compatible with erosive gastritis. No evidence of bleeding noted in stomach. At the exit of duodenal bulb a 2cm clean based ulcer with raised edematous and erythematous margins was noted on the right wall. No active bleeding or high risk stigmata noted. Injected Epinephrine on four walls. Also on the left wall a 1 cm clean based ulcer noted with no active bleeding or high risk stigmata of bleeding. Second portion of duodenum appeared normal.   per GI c/w IV protonix gtt 8mg/hour for today   advance diet to soft   check H pylori serologies per GI     # aflutter on initial EKG CHADSVAC is 3: HTN, DM and age   patient now tachy but appears sinus   given advanced age, GI bleed as above requiring 4 units of PRBC from PUD and and also seen by PT deemed high risk for falls. risks of anticoagulation outweigh benefits will need to discuss with patient and family   start lopressor 25 BID for better rate control   check TSH   echo pending     #HTN: adding lopressor monitor     #DM keep FS       89 year old male with PMHx of Peptic ulcer disease (>60 years ago), HTN, DM2 (not on any meds), iron deficiency anemia? sent in due to hypoxia. Patient lives alone, daughter last reached out to him Friday, could not reach him since, so today told friend to check on him, they got to the house, patient was too weak could not open door chain, so they called EMS. EMS found patient to be hypoxic to the 80s, he was complaining of feeling weak. patient has been complaining of melena for 2 weeks, twice a day loose black stool and abdominal pain. patient reports having EGD in the past and was diagnosed with PUD? he is on PPI at home.    In ED patient tachy to 130s, BP 59/35 on admission currently tachycardic 107, /79. Hbg 4.2 MCV 82.5 dce=254  BUN=63 Cr=2.2, no baseline labs, TEDDY + melena, s/p 4 units PRBCs total.      A/P:   #Upper GI bleed   s/p EGD < from: EGD (01.26.21 @ 15:15) > Impressions:  Normal mucosa in the whole esophagus.  Erosions in the antrum compatible with erosive gastritis. No evidence of bleeding noted in stomach. At the exit of duodenal bulb a 2cm clean based ulcer with raised edematous and erythematous margins was noted on the right wall. No active bleeding or high risk stigmata noted. Injected Epinephrine on four walls. Also on the left wall a 1 cm clean based ulcer noted with no active bleeding or high risk stigmata of bleeding. Second portion of duodenum appeared normal.   per GI c/w IV protonix gtt 8mg/hour for today   advance diet to soft   check H pylori serologies per GI     # aflutter on initial EKG CHADSVAC is 3: HTN, DM and age   patient now tachy but appears sinus   given advanced age, GI bleed as above requiring 4 units of PRBC from PUD and and also seen by PT deemed high risk for falls. risks of anticoagulation outweigh benefits will need to discuss with patient and family   start lopressor 25 BID for better rate control   check TSH   echo pending     #leukocytosis no signs of infection monitor     #ANISH in the setting of anemia and GI improving likely has underlying unknown stage of CKD     #HTN: adding lopressor monitor     #DM keep FS

## 2021-01-27 NOTE — OCCUPATIONAL THERAPY INITIAL EVALUATION ADULT - PERTINENT HX OF CURRENT PROBLEM, REHAB EVAL
89 year old male with PMHx of Peptic ulcer disease (>60 years ago), HTN, DM2 (not on any meds), iron deficiency anemia sent in due to hypoxia, weakness, and abdominal discomfort.

## 2021-01-27 NOTE — PHYSICAL THERAPY INITIAL EVALUATION ADULT - LIVES WITH, PROFILE
Pt lives alone in an apartment with 1 flight of stairs to enter. Pt states his daughter checks on him and will be available to assist as needed upon d/c./alone

## 2021-01-27 NOTE — PROGRESS NOTE ADULT - ASSESSMENT
89 year old male with PMHx of Peptic ulcer disease (>60 years ago), HTN, DM2 (not on any meds), iron deficiency anemia? sent in due to hypoxia. Patient lives alone, daughter last reached out to him Friday, could not reach him since, so today told friend to check on him, they got to the house, patient was too weak could not open door chain, so they called EMS. EMS found patient to be hypoxic to the 80s, he was complaining of feeling weak. patient has been complaining of melena for 2 weeks, twice a day loose black stool and abdominal pain. patient reports having EGD in the past and was diagnosed with PUD? he is on PPI at home.  In ED patient tachy to 130s, BP 59/35 on admission currently tachycardic 107, /79. Hbg 4.2 MCV 82.5 brc=361  BUN=63 Cr=2.2, no baseline labs, TEDDY + melena, s/p 4 units PRBCs total.  s/p EGD < from: EGD (01.26.21 @ 15:15) > Impressions:  Normal mucosa in the whole esophagus.  Erosions in the antrum compatible with erosive gastritis. No evidence of bleeding noted in stomach. At the exit of duodenal bulb a 2cm clean based ulcer with raised edematous and erythematous margins was noted on the right wall. No active bleeding or high risk stigmata noted. Injected Epinephrine on four walls. Also on the left wall a 1 cm clean based ulcer noted with no active bleeding or high risk stigmata of bleeding. Second portion of duodenum appeared normal.     *Melena 2/2 duodenal ulcers   -Hb stable     Plan  -Monitor CBC and transfuse as needed  -C/w IV protonix drip for today  -Avoid NSAIDS  -Check H.pylori serologies and treat if positive, patient will be on PPI for life and stool Ag test for HP can be falsely negative on PPI  -2 large bore IV  -keep active type and screen  -can advance diet    -will follow

## 2021-01-27 NOTE — PROGRESS NOTE ADULT - SUBJECTIVE AND OBJECTIVE BOX
DAILY PROGRESS NOTE  ===========================================================    Patient Information:  NINI LANIER  /  89yo  /  Male  /  MRN#: [id number]    Hospital Day: 1d     |:::::::::::::::::::::::::::| SUBJECTIVE |:::::::::::::::::::::::::::|    OVERNIGHT EVENTS: None  TODAY: Patient was seen today at bedside. Review of systems is negative.    |:::::::::::::::::::::::::::| OBJECTIVE |:::::::::::::::::::::::::::|    VITAL SIGNS: Last 24 Hours    Vital Signs Last 24 Hrs  T(C): 36.2 (27 Jan 2021 05:22), Max: 37.3 (26 Jan 2021 20:41)  T(F): 97.1 (27 Jan 2021 05:22), Max: 99.1 (26 Jan 2021 20:41)  HR: 110 (27 Jan 2021 09:26) (65 - 150)  BP: 135/97 (27 Jan 2021 08:25) (125/84 - 184/96)  BP(mean): --  RR: 19 (27 Jan 2021 05:22) (18 - 100)  SpO2: 98% (27 Jan 2021 07:43) (95% - 100%)    I&O's Summary    26 Jan 2021 07:01  -  27 Jan 2021 07:00  --------------------------------------------------------  IN: 900 mL / OUT: 1300 mL / NET: -400 mL      MEDICATIONS:  MEDICATIONS  (STANDING):  chlorhexidine 4% Liquid 1 Application(s) Topical <User Schedule>  metoprolol tartrate 25 milliGRAM(s) Oral two times a day  pantoprazole Infusion 8 mG/Hr (10 mL/Hr) IV Continuous <Continuous>    MEDICATIONS  (PRN):      PHYSICAL EXAM:  GENERAL: A&Ox3, NAD, alert  HEENT:  Conjunctivae pink, sclera anicteric, oral mucosa moist   CARDIO: Regular rate & rhythm, no murmurs, rubs or gallops  RESP:   No rales, wheezing, or rhonchi appreciated  GI: Soft nontender, nondistended, + bowel sounds   EXT: 2+ Peripheral pulses, no b/l edema   SKIN: No rashes or lesions    LAB RESULTS:                                   9.3    15.71 )-----------( 249      ( 27 Jan 2021 06:32 )             28.8   01-27    144  |  109  |  32<H>  ----------------------------<  116<H>  4.1   |  22  |  1.8<H>    Ca    8.8      27 Jan 2021 06:32  Mg     2.0     01-27    TPro  5.2<L>  /  Alb  3.4<L>  /  TBili  1.7<H>  /  DBili  x   /  AST  27  /  ALT  10  /  AlkPhos  62  01-27    Troponin T, Serum in AM (01.27.21 @ 06:32) Troponin T, Serum: 0.06: Critical value: ng/mL   bSerum Pro-Brain Natriuretic Peptide (01.25.21 @ 16:33)   Serum Pro-Brain Natriuretic Peptide: 3274 pg/mL     CAPILLARY BLOOD GLUCOSE      CAPILLARY BLOOD GLUCOSE  POCT Blood Glucose.: 131 mg/dL (27 Jan 2021 07:31)  POCT Blood Glucose.: 126 mg/dL (26 Jan 2021 22:48)  POCT Blood Glucose.: 160 mg/dL (26 Jan 2021 16:56)  POCT Blood Glucose.: 149 mg/dL (26 Jan 2021 11:20)      MICROBIOLOGY:    RADIOLOGY:    < from: EGD (01.26.21 @ 15:15) >  Impressions:    Normal mucosa in the whole esophagus.    Erosions in the antrum compatible with erosive gastritis.    -No evidence of bleeding noted in stomach. .    -At the exit of duodenal bulb a 2cm clean based ulcer with raised edematous and  erythematous margins was noted on the right wall. No active bleeding or high  risk stigmata noted. Injected Epinephrine on four walls. Also on the left wall a  1 cm clean based ulcer noted with no active bleeding or high risk stigmata of  bleeding. .    -Second portion of duodenum appeared normal. .     < end of copied text >    < from: CT Angio Chest PE Protocol w/ IV Cont (01.26.21 @ 00:33) >  Chest:  No evidence for acute pulmonary embolus.  No CT evidence of pneumonia.  Mild areas of interlobular septal thickening may reflect mild pulmonary edema.  Upper lobe predominant centrilobular emphysema.    Abdomen:  Questionable small outpouching at the proximal duodenum with wall thickening versus volume averaging with adjacent motion artifact. Small inflamed duodenal ulcer is not excluded.  Atrophic left kidney with heterogeneous enhancement and mild fullness of the distal left ureter. Please correlate with urinalysis to exclude left-sided urinary infection/pyelonephritis.    < end of copied text >  < from: Xray Chest 1 View- PORTABLE-Urgent (01.25.21 @ 17:47) >    No consolidation, effusion or pneumothorax.    < end of copied text >    ALLERGIES: No known drug, environmental, or food allergies.     ===========================================================      Assessment and Plan:   · Assessment	  89yo M, from home, with PMHx of PUD, T2DM, Fe deficiency anemia, presents for abdominal pain.     # Upper GI Bleed  - EGD (1/26): Duodenal ulcer w/ clean base, no active bleeding  - As per GI:  - C/w Protonix drip for today 1/27  - Avoid NSAIDs  - Check H. pylori serologies, treat if positive  - Advance to clear liquid diet  - Hgb: 4.2 >> 7.5 (s/p 3U pRBC) >> 7.5 >> 8.6 (s/p 1U pRBC) >> 9.3    # New Onset Arrhythmia  - EKG (1/25): Atrial flutter w/ 2:1 block, lateral infarct (age undetermined), ST&T wave abnormalities (consider inferior ischemia)   - EKG (1/27): AV block, sinus tachycardia, premature atrial complexes, nonspecific T-wave abnormality  - Start on Metoprolol 25mg q12  - F/u GI for anticoagulation recs  - C/s Cardiology  - F/u TTE     # DM  - Pt not on any home medications  - F/u A1c    DVT PPx: None   GI PPX: Pantoprazole Infusion  Diet: Clear liquid diet   Activity: IAT  Dispo: Home  Code Status: DNR       DAILY PROGRESS NOTE  ===========================================================    Patient Information:  NINI LANIER  /  89yo  /  Male  /  MRN#: [id number]    Hospital Day: 1d     |:::::::::::::::::::::::::::| SUBJECTIVE |:::::::::::::::::::::::::::|    OVERNIGHT EVENTS: None  TODAY: Patient was seen today at bedside. Review of systems is negative.    |:::::::::::::::::::::::::::| OBJECTIVE |:::::::::::::::::::::::::::|    VITAL SIGNS: Last 24 Hours    Vital Signs Last 24 Hrs  T(C): 36.2 (27 Jan 2021 05:22), Max: 37.3 (26 Jan 2021 20:41)  T(F): 97.1 (27 Jan 2021 05:22), Max: 99.1 (26 Jan 2021 20:41)  HR: 110 (27 Jan 2021 09:26) (65 - 150)  BP: 135/97 (27 Jan 2021 08:25) (125/84 - 184/96)  BP(mean): --  RR: 19 (27 Jan 2021 05:22) (18 - 100)  SpO2: 98% (27 Jan 2021 07:43) (95% - 100%)    I&O's Summary    26 Jan 2021 07:01  -  27 Jan 2021 07:00  --------------------------------------------------------  IN: 900 mL / OUT: 1300 mL / NET: -400 mL      MEDICATIONS:  MEDICATIONS  (STANDING):  chlorhexidine 4% Liquid 1 Application(s) Topical <User Schedule>  metoprolol tartrate 25 milliGRAM(s) Oral two times a day  pantoprazole Infusion 8 mG/Hr (10 mL/Hr) IV Continuous <Continuous>    MEDICATIONS  (PRN):      PHYSICAL EXAM:  GENERAL: A&Ox3, NAD, alert  HEENT:  Conjunctivae pink, sclera anicteric, oral mucosa moist   CARDIO: Regular rate & rhythm, no murmurs, rubs or gallops  RESP:   No rales, wheezing, or rhonchi appreciated  GI: Soft nontender, nondistended, + bowel sounds   EXT: 2+ Peripheral pulses, no b/l edema   SKIN: No rashes or lesions    LAB RESULTS:                                   9.3    15.71 )-----------( 249      ( 27 Jan 2021 06:32 )             28.8   01-27    144  |  109  |  32<H>  ----------------------------<  116<H>  4.1   |  22  |  1.8<H>    Ca    8.8      27 Jan 2021 06:32  Mg     2.0     01-27    TPro  5.2<L>  /  Alb  3.4<L>  /  TBili  1.7<H>  /  DBili  x   /  AST  27  /  ALT  10  /  AlkPhos  62  01-27    Troponin T, Serum in AM (01.27.21 @ 06:32) Troponin T, Serum: 0.06: Critical value: ng/mL   Serum Pro-Brain Natriuretic Peptide (01.25.21 @ 16:33) Serum Pro-Brain Natriuretic Peptide: 3274 pg/mL     CAPILLARY BLOOD GLUCOSE      CAPILLARY BLOOD GLUCOSE  POCT Blood Glucose.: 131 mg/dL (27 Jan 2021 07:31)  POCT Blood Glucose.: 126 mg/dL (26 Jan 2021 22:48)  POCT Blood Glucose.: 160 mg/dL (26 Jan 2021 16:56)  POCT Blood Glucose.: 149 mg/dL (26 Jan 2021 11:20)      MICROBIOLOGY:    RADIOLOGY:    < from: EGD (01.26.21 @ 15:15) >  Impressions:    Normal mucosa in the whole esophagus.    Erosions in the antrum compatible with erosive gastritis.    -No evidence of bleeding noted in stomach. .    -At the exit of duodenal bulb a 2cm clean based ulcer with raised edematous and  erythematous margins was noted on the right wall. No active bleeding or high  risk stigmata noted. Injected Epinephrine on four walls. Also on the left wall a  1 cm clean based ulcer noted with no active bleeding or high risk stigmata of  bleeding. .    -Second portion of duodenum appeared normal. .     < end of copied text >    < from: CT Angio Chest PE Protocol w/ IV Cont (01.26.21 @ 00:33) >  Chest:  No evidence for acute pulmonary embolus.  No CT evidence of pneumonia.  Mild areas of interlobular septal thickening may reflect mild pulmonary edema.  Upper lobe predominant centrilobular emphysema.    Abdomen:  Questionable small outpouching at the proximal duodenum with wall thickening versus volume averaging with adjacent motion artifact. Small inflamed duodenal ulcer is not excluded.  Atrophic left kidney with heterogeneous enhancement and mild fullness of the distal left ureter. Please correlate with urinalysis to exclude left-sided urinary infection/pyelonephritis.    < end of copied text >  < from: Xray Chest 1 View- PORTABLE-Urgent (01.25.21 @ 17:47) >    No consolidation, effusion or pneumothorax.    < end of copied text >    ALLERGIES: No known drug, environmental, or food allergies.     ===========================================================      Assessment and Plan:   · Assessment	  89yo M, from home, with PMHx of PUD, T2DM, Fe deficiency anemia, presents for abdominal pain.     # Upper GI Bleed  - EGD (1/26): Duodenal ulcer w/ clean base, no active bleeding  - As per GI:  - C/w Protonix drip for today 1/27  - Avoid NSAIDs  - Check H. pylori serologies, treat if positive  - Advance to clear liquid diet  - Hgb: 4.2 >> 7.5 (s/p 3U pRBC) >> 7.5 >> 8.6 (s/p 1U pRBC) >> 9.3    # New Onset Arrhythmia  - EKG (1/25): Atrial flutter w/ 2:1 block, lateral infarct (age undetermined), ST&T wave abnormalities (consider inferior ischemia)   - EKG (1/27): AV block, sinus tachycardia, premature atrial complexes, nonspecific T-wave abnormality  - Start on Metoprolol 25mg q12  - F/u GI for anticoagulation recs  - C/s Cardiology  - F/u TTE     # DM  - Pt not on any home medications  - F/u A1c    DVT PPx: None   GI PPX: Pantoprazole Infusion  Diet: Clear liquid diet   Activity: IAT (utilizes a RW at baseline, c/s PT)   Dispo: Home  Code Status: DNR       DAILY PROGRESS NOTE  ===========================================================    Patient Information:  NINI LANIER  /  89yo  /  Male  /  MRN#: [id number]    Hospital Day: 1d     |:::::::::::::::::::::::::::| SUBJECTIVE |:::::::::::::::::::::::::::|    OVERNIGHT EVENTS: None  TODAY: Patient was seen today at bedside. Review of systems is negative.    |:::::::::::::::::::::::::::| OBJECTIVE |:::::::::::::::::::::::::::|    VITAL SIGNS: Last 24 Hours    Vital Signs Last 24 Hrs  T(C): 36.2 (27 Jan 2021 05:22), Max: 37.3 (26 Jan 2021 20:41)  T(F): 97.1 (27 Jan 2021 05:22), Max: 99.1 (26 Jan 2021 20:41)  HR: 110 (27 Jan 2021 09:26) (65 - 150)  BP: 135/97 (27 Jan 2021 08:25) (125/84 - 184/96)  BP(mean): --  RR: 19 (27 Jan 2021 05:22) (18 - 100)  SpO2: 98% (27 Jan 2021 07:43) (95% - 100%)    I&O's Summary    26 Jan 2021 07:01  -  27 Jan 2021 07:00  --------------------------------------------------------  IN: 900 mL / OUT: 1300 mL / NET: -400 mL      MEDICATIONS:  MEDICATIONS  (STANDING):  chlorhexidine 4% Liquid 1 Application(s) Topical <User Schedule>  metoprolol tartrate 25 milliGRAM(s) Oral two times a day  pantoprazole Infusion 8 mG/Hr (10 mL/Hr) IV Continuous <Continuous>    MEDICATIONS  (PRN):      PHYSICAL EXAM:  GENERAL: A&Ox3, NAD, alert  HEENT:  Conjunctivae pink, sclera anicteric, oral mucosa moist   CARDIO: Regular rate & rhythm, no murmurs, rubs or gallops  RESP:   No rales, wheezing, or rhonchi appreciated  GI: Soft nontender, nondistended, + bowel sounds   EXT: 2+ Peripheral pulses, no b/l edema   SKIN: No rashes or lesions    LAB RESULTS:                                   9.3    15.71 )-----------( 249      ( 27 Jan 2021 06:32 )             28.8   01-27    144  |  109  |  32<H>  ----------------------------<  116<H>  4.1   |  22  |  1.8<H>    Ca    8.8      27 Jan 2021 06:32  Mg     2.0     01-27    TPro  5.2<L>  /  Alb  3.4<L>  /  TBili  1.7<H>  /  DBili  x   /  AST  27  /  ALT  10  /  AlkPhos  62  01-27    Troponin T, Serum in AM (01.27.21 @ 06:32) Troponin T, Serum: 0.06: Critical value: ng/mL   Serum Pro-Brain Natriuretic Peptide (01.25.21 @ 16:33) Serum Pro-Brain Natriuretic Peptide: 3274 pg/mL     CAPILLARY BLOOD GLUCOSE      CAPILLARY BLOOD GLUCOSE  POCT Blood Glucose.: 131 mg/dL (27 Jan 2021 07:31)  POCT Blood Glucose.: 126 mg/dL (26 Jan 2021 22:48)  POCT Blood Glucose.: 160 mg/dL (26 Jan 2021 16:56)  POCT Blood Glucose.: 149 mg/dL (26 Jan 2021 11:20)      MICROBIOLOGY:    RADIOLOGY:    < from: EGD (01.26.21 @ 15:15) >  Impressions:    Normal mucosa in the whole esophagus.    Erosions in the antrum compatible with erosive gastritis.    -No evidence of bleeding noted in stomach. .    -At the exit of duodenal bulb a 2cm clean based ulcer with raised edematous and  erythematous margins was noted on the right wall. No active bleeding or high  risk stigmata noted. Injected Epinephrine on four walls. Also on the left wall a  1 cm clean based ulcer noted with no active bleeding or high risk stigmata of  bleeding. .    -Second portion of duodenum appeared normal. .     < end of copied text >    < from: CT Angio Chest PE Protocol w/ IV Cont (01.26.21 @ 00:33) >  Chest:  No evidence for acute pulmonary embolus.  No CT evidence of pneumonia.  Mild areas of interlobular septal thickening may reflect mild pulmonary edema.  Upper lobe predominant centrilobular emphysema.    Abdomen:  Questionable small outpouching at the proximal duodenum with wall thickening versus volume averaging with adjacent motion artifact. Small inflamed duodenal ulcer is not excluded.  Atrophic left kidney with heterogeneous enhancement and mild fullness of the distal left ureter. Please correlate with urinalysis to exclude left-sided urinary infection/pyelonephritis.    < end of copied text >  < from: Xray Chest 1 View- PORTABLE-Urgent (01.25.21 @ 17:47) >    No consolidation, effusion or pneumothorax.    < end of copied text >    ALLERGIES: No known drug, environmental, or food allergies.     ===========================================================      Assessment and Plan:   · Assessment	  89yo M, from home, with PMHx of PUD, T2DM, Fe deficiency anemia, presents for abdominal pain.     # Upper GI Bleed  - EGD (1/26): Duodenal ulcer w/ clean base, no active bleeding  - As per GI:  - C/w Protonix drip for today 1/27  - Avoid NSAIDs  - Check H. pylori serologies, treat if positive  - Advance to clear liquid diet  - Hgb: 4.2 >> 7.5 (s/p 3U pRBC) >> 7.5 >> 8.6 (s/p 1U pRBC) >> 9.3    # New Onset Arrhythmia  - EKG (1/25): Atrial flutter w/ 2:1 block, lateral infarct (age undetermined), ST&T wave abnormalities (consider inferior ischemia)   - EKG (1/27): AV block, sinus tachycardia, premature atrial complexes, nonspecific T-wave abnormality  - Start on Metoprolol 25mg q12  - F/u GI for anticoagulation recs  - C/s Cardiology  - F/u TTE     # DM  - Pt not on any home medications  - F/u A1c    # HTN?  - Pt reports no home medications  - BP is WNL    DVT PPx: None   GI PPX: Pantoprazole Infusion  Diet: Clear liquid diet   Activity: IAT (utilizes a RW at baseline, c/s PT)   Dispo: Home  Code Status: DNR       DAILY PROGRESS NOTE  ===========================================================    Patient Information:  NINI LANIER  /  89yo  /  Male  /  MRN#: [id number]    Hospital Day: 1d     |:::::::::::::::::::::::::::| SUBJECTIVE |:::::::::::::::::::::::::::|    OVERNIGHT EVENTS: None  TODAY: Patient was seen today at bedside. Review of systems is negative.    |:::::::::::::::::::::::::::| OBJECTIVE |:::::::::::::::::::::::::::|    VITAL SIGNS: Last 24 Hours    Vital Signs Last 24 Hrs  T(C): 36.2 (27 Jan 2021 05:22), Max: 37.3 (26 Jan 2021 20:41)  T(F): 97.1 (27 Jan 2021 05:22), Max: 99.1 (26 Jan 2021 20:41)  HR: 110 (27 Jan 2021 09:26) (65 - 150)  BP: 135/97 (27 Jan 2021 08:25) (125/84 - 184/96)  BP(mean): --  RR: 19 (27 Jan 2021 05:22) (18 - 100)  SpO2: 98% (27 Jan 2021 07:43) (95% - 100%)    I&O's Summary    26 Jan 2021 07:01  -  27 Jan 2021 07:00  --------------------------------------------------------  IN: 900 mL / OUT: 1300 mL / NET: -400 mL      MEDICATIONS:  MEDICATIONS  (STANDING):  chlorhexidine 4% Liquid 1 Application(s) Topical <User Schedule>  metoprolol tartrate 25 milliGRAM(s) Oral two times a day  pantoprazole Infusion 8 mG/Hr (10 mL/Hr) IV Continuous <Continuous>    MEDICATIONS  (PRN):      PHYSICAL EXAM:  GENERAL: A&Ox3, NAD, alert  HEENT:  Conjunctivae pink, sclera anicteric, oral mucosa moist   CARDIO: Regular rate & rhythm, no murmurs, rubs or gallops  RESP:   No rales, wheezing, or rhonchi appreciated  GI: Soft nontender, nondistended, + bowel sounds   EXT: 2+ Peripheral pulses, no b/l edema   SKIN: No rashes or lesions    LAB RESULTS:                                   9.3    15.71 )-----------( 249      ( 27 Jan 2021 06:32 )             28.8   01-27    144  |  109  |  32<H>  ----------------------------<  116<H>  4.1   |  22  |  1.8<H>    Ca    8.8      27 Jan 2021 06:32  Mg     2.0     01-27    TPro  5.2<L>  /  Alb  3.4<L>  /  TBili  1.7<H>  /  DBili  x   /  AST  27  /  ALT  10  /  AlkPhos  62  01-27    Troponin T, Serum in AM (01.27.21 @ 06:32) Troponin T, Serum: 0.06: Critical value: ng/mL   Serum Pro-Brain Natriuretic Peptide (01.25.21 @ 16:33) Serum Pro-Brain Natriuretic Peptide: 3274 pg/mL     CAPILLARY BLOOD GLUCOSE      CAPILLARY BLOOD GLUCOSE  POCT Blood Glucose.: 131 mg/dL (27 Jan 2021 07:31)  POCT Blood Glucose.: 126 mg/dL (26 Jan 2021 22:48)  POCT Blood Glucose.: 160 mg/dL (26 Jan 2021 16:56)  POCT Blood Glucose.: 149 mg/dL (26 Jan 2021 11:20)      MICROBIOLOGY:    RADIOLOGY:    < from: EGD (01.26.21 @ 15:15) >  Impressions:    Normal mucosa in the whole esophagus.    Erosions in the antrum compatible with erosive gastritis.    -No evidence of bleeding noted in stomach. .    -At the exit of duodenal bulb a 2cm clean based ulcer with raised edematous and  erythematous margins was noted on the right wall. No active bleeding or high  risk stigmata noted. Injected Epinephrine on four walls. Also on the left wall a  1 cm clean based ulcer noted with no active bleeding or high risk stigmata of  bleeding. .    -Second portion of duodenum appeared normal. .     < end of copied text >    < from: CT Angio Chest PE Protocol w/ IV Cont (01.26.21 @ 00:33) >  Chest:  No evidence for acute pulmonary embolus.  No CT evidence of pneumonia.  Mild areas of interlobular septal thickening may reflect mild pulmonary edema.  Upper lobe predominant centrilobular emphysema.    Abdomen:  Questionable small outpouching at the proximal duodenum with wall thickening versus volume averaging with adjacent motion artifact. Small inflamed duodenal ulcer is not excluded.  Atrophic left kidney with heterogeneous enhancement and mild fullness of the distal left ureter. Please correlate with urinalysis to exclude left-sided urinary infection/pyelonephritis.    < end of copied text >  < from: Xray Chest 1 View- PORTABLE-Urgent (01.25.21 @ 17:47) >    No consolidation, effusion or pneumothorax.    < end of copied text >    ALLERGIES: No known drug, environmental, or food allergies.     ===========================================================      Assessment and Plan:   · Assessment	  89yo M, from home, with PMHx of PUD, T2DM, Fe deficiency anemia, presents for abdominal pain.     # Upper GI Bleed  - EGD (1/26): Duodenal ulcer w/ clean base, no active bleeding  - As per GI:  - C/w Protonix drip for today 1/27  - Avoid NSAIDs  - Check H. pylori serologies, treat if positive  - Advance to clear liquid diet  - Hgb: 4.2 >> 7.5 (s/p 3U pRBC) >> 7.5 >> 8.6 (s/p 1U pRBC) >> 9.3    # New Onset Arrhythmia  - EKG (1/25): Atrial flutter w/ 2:1 block, lateral infarct (age undetermined), ST&T wave abnormalities (consider inferior ischemia)   - EKG (1/27): AV block, sinus tachycardia, premature atrial complexes, nonspecific T-wave abnormality  - Start on Metoprolol 25mg q12  - F/u GI for anticoagulation recs  - C/s Cardiology  - F/u TTE     # DM  - Pt not on any home medications  - F/u A1c    # HTN?  - Pt reports no home medications  - BP is WNL    DVT PPx: None   GI PPX: Pantoprazole Infusion  Diet: Clear liquid diet   Activity: IAT (utilizes a RW at baseline, PT recommends home w/ PT or assist)  Dispo: Home  Code Status: DNR       DAILY PROGRESS NOTE  ===========================================================    Patient Information:  NINI LANIER  /  89yo  /  Male  /  MRN#: [id number]    Hospital Day: 1d     |:::::::::::::::::::::::::::| SUBJECTIVE |:::::::::::::::::::::::::::|    OVERNIGHT EVENTS: None  TODAY: Patient was seen today at bedside. Review of systems is negative.    |:::::::::::::::::::::::::::| OBJECTIVE |:::::::::::::::::::::::::::|    VITAL SIGNS: Last 24 Hours    Vital Signs Last 24 Hrs  T(C): 36.2 (27 Jan 2021 05:22), Max: 37.3 (26 Jan 2021 20:41)  T(F): 97.1 (27 Jan 2021 05:22), Max: 99.1 (26 Jan 2021 20:41)  HR: 110 (27 Jan 2021 09:26) (65 - 150)  BP: 135/97 (27 Jan 2021 08:25) (125/84 - 184/96)  BP(mean): --  RR: 19 (27 Jan 2021 05:22) (18 - 100)  SpO2: 98% (27 Jan 2021 07:43) (95% - 100%)    I&O's Summary    26 Jan 2021 07:01  -  27 Jan 2021 07:00  --------------------------------------------------------  IN: 900 mL / OUT: 1300 mL / NET: -400 mL      MEDICATIONS:  MEDICATIONS  (STANDING):  chlorhexidine 4% Liquid 1 Application(s) Topical <User Schedule>  metoprolol tartrate 25 milliGRAM(s) Oral two times a day  pantoprazole Infusion 8 mG/Hr (10 mL/Hr) IV Continuous <Continuous>    MEDICATIONS  (PRN):      PHYSICAL EXAM:  GENERAL: A&Ox3, NAD, alert  HEENT:  Conjunctivae pink, sclera anicteric, oral mucosa moist   CARDIO: Regular rate & rhythm, no murmurs, rubs or gallops  RESP:   No rales, wheezing, or rhonchi appreciated  GI: Soft nontender, nondistended, + bowel sounds   EXT: 2+ Peripheral pulses, no b/l edema   SKIN: No rashes or lesions    LAB RESULTS:                                   9.3    15.71 )-----------( 249      ( 27 Jan 2021 06:32 )             28.8   01-27    144  |  109  |  32<H>  ----------------------------<  116<H>  4.1   |  22  |  1.8<H>    Ca    8.8      27 Jan 2021 06:32  Mg     2.0     01-27    TPro  5.2<L>  /  Alb  3.4<L>  /  TBili  1.7<H>  /  DBili  x   /  AST  27  /  ALT  10  /  AlkPhos  62  01-27    Troponin T, Serum in AM (01.27.21 @ 06:32) Troponin T, Serum: 0.06: Critical value: ng/mL   Serum Pro-Brain Natriuretic Peptide (01.25.21 @ 16:33) Serum Pro-Brain Natriuretic Peptide: 3274 pg/mL     CAPILLARY BLOOD GLUCOSE      CAPILLARY BLOOD GLUCOSE  POCT Blood Glucose.: 131 mg/dL (27 Jan 2021 07:31)  POCT Blood Glucose.: 126 mg/dL (26 Jan 2021 22:48)  POCT Blood Glucose.: 160 mg/dL (26 Jan 2021 16:56)  POCT Blood Glucose.: 149 mg/dL (26 Jan 2021 11:20)      MICROBIOLOGY:    RADIOLOGY:    < from: EGD (01.26.21 @ 15:15) >  Impressions:    Normal mucosa in the whole esophagus.    Erosions in the antrum compatible with erosive gastritis.    -No evidence of bleeding noted in stomach. .    -At the exit of duodenal bulb a 2cm clean based ulcer with raised edematous and  erythematous margins was noted on the right wall. No active bleeding or high  risk stigmata noted. Injected Epinephrine on four walls. Also on the left wall a  1 cm clean based ulcer noted with no active bleeding or high risk stigmata of  bleeding. .    -Second portion of duodenum appeared normal. .     < end of copied text >    < from: CT Angio Chest PE Protocol w/ IV Cont (01.26.21 @ 00:33) >  Chest:  No evidence for acute pulmonary embolus.  No CT evidence of pneumonia.  Mild areas of interlobular septal thickening may reflect mild pulmonary edema.  Upper lobe predominant centrilobular emphysema.    Abdomen:  Questionable small outpouching at the proximal duodenum with wall thickening versus volume averaging with adjacent motion artifact. Small inflamed duodenal ulcer is not excluded.  Atrophic left kidney with heterogeneous enhancement and mild fullness of the distal left ureter. Please correlate with urinalysis to exclude left-sided urinary infection/pyelonephritis.    < end of copied text >  < from: Xray Chest 1 View- PORTABLE-Urgent (01.25.21 @ 17:47) >    No consolidation, effusion or pneumothorax.    < end of copied text >    ALLERGIES: No known drug, environmental, or food allergies.     ===========================================================      Assessment and Plan:   · Assessment	  89yo M, from home, with PMHx of PUD, T2DM, Fe deficiency anemia, presents for abdominal pain.     # Upper GI Bleed  - EGD (1/26): Duodenal ulcers w/ clean bases, no active bleeding  - As per GI:  - C/w Protonix drip for today 1/27  - Avoid NSAIDs  - Check H. pylori serologies, treat if positive  - Advance to clear liquid diet  - Hgb: 4.2 >> 7.5 (s/p 3U pRBC) >> 7.5 >> 8.6 (s/p 1U pRBC) >> 9.3    # New Onset Afib/Aflutter  - Troponins: .05 >> .07 >> .06 (1/27)  - EKG (1/25): Atrial flutter w/ 2:1 block, lateral infarct (age undetermined), ST&T wave abnormalities (consider inferior ischemia)   - EKG (1/27): AV block, sinus tachycardia, premature atrial complexes, nonspecific T-wave abnormality  - Start on Metoprolol 25mg q12  - F/u GI for anticoagulation recs  - F/u Cardiology recs  - F/u TTE     # Hypoxia  - 1/26: EMS found pt sating at 80%, sat 99% on 3L NC in ED  - C/w 2L NC (sating 96%)    # DM  - Pt not on any home medications  - F/u A1c    # HTN?  - Pt reports no home medications  - BP is WNL    DVT PPx: None   GI PPX: Pantoprazole Infusion  Diet: Clear liquid diet   Activity: IAT (utilizes a RW at baseline, PT recommends dispo home w/ PT or w/ assist)  Dispo: Home  Code Status: DNR       DAILY PROGRESS NOTE  ===========================================================    Patient Information:  NINI LANIER  /  89yo  /  Male  /  MRN#: [id number]    Hospital Day: 1d     |:::::::::::::::::::::::::::| SUBJECTIVE |:::::::::::::::::::::::::::|    OVERNIGHT EVENTS: None  TODAY: Patient was seen today at bedside. Review of systems is negative.    |:::::::::::::::::::::::::::| OBJECTIVE |:::::::::::::::::::::::::::|    VITAL SIGNS: Last 24 Hours    Vital Signs Last 24 Hrs  T(C): 36.2 (27 Jan 2021 05:22), Max: 37.3 (26 Jan 2021 20:41)  T(F): 97.1 (27 Jan 2021 05:22), Max: 99.1 (26 Jan 2021 20:41)  HR: 110 (27 Jan 2021 09:26) (65 - 150)  BP: 135/97 (27 Jan 2021 08:25) (125/84 - 184/96)  BP(mean): --  RR: 19 (27 Jan 2021 05:22) (18 - 100)  SpO2: 98% (27 Jan 2021 07:43) (95% - 100%)    I&O's Summary    26 Jan 2021 07:01  -  27 Jan 2021 07:00  --------------------------------------------------------  IN: 900 mL / OUT: 1300 mL / NET: -400 mL      MEDICATIONS:  MEDICATIONS  (STANDING):  chlorhexidine 4% Liquid 1 Application(s) Topical <User Schedule>  metoprolol tartrate 25 milliGRAM(s) Oral two times a day  pantoprazole Infusion 8 mG/Hr (10 mL/Hr) IV Continuous <Continuous>    MEDICATIONS  (PRN):      PHYSICAL EXAM:  GENERAL: A&Ox3, NAD, alert  HEENT:  Conjunctivae pink, sclera anicteric, oral mucosa moist   CARDIO: Regular rate & rhythm, no murmurs, rubs or gallops  RESP:   No rales, wheezing, or rhonchi appreciated  GI: Soft nontender, nondistended, + bowel sounds   EXT: 2+ Peripheral pulses, no b/l edema   SKIN: No rashes or lesions    LAB RESULTS:                                   9.3    15.71 )-----------( 249      ( 27 Jan 2021 06:32 )             28.8   01-27    144  |  109  |  32<H>  ----------------------------<  116<H>  4.1   |  22  |  1.8<H>    Ca    8.8      27 Jan 2021 06:32  Mg     2.0     01-27    TPro  5.2<L>  /  Alb  3.4<L>  /  TBili  1.7<H>  /  DBili  x   /  AST  27  /  ALT  10  /  AlkPhos  62  01-27    Troponin T, Serum in AM (01.27.21 @ 06:32) Troponin T, Serum: 0.06: Critical value: ng/mL   Serum Pro-Brain Natriuretic Peptide (01.25.21 @ 16:33) Serum Pro-Brain Natriuretic Peptide: 3274 pg/mL     CAPILLARY BLOOD GLUCOSE      CAPILLARY BLOOD GLUCOSE  POCT Blood Glucose.: 131 mg/dL (27 Jan 2021 07:31)  POCT Blood Glucose.: 126 mg/dL (26 Jan 2021 22:48)  POCT Blood Glucose.: 160 mg/dL (26 Jan 2021 16:56)  POCT Blood Glucose.: 149 mg/dL (26 Jan 2021 11:20)      MICROBIOLOGY:    RADIOLOGY:    < from: EGD (01.26.21 @ 15:15) >  Impressions:    Normal mucosa in the whole esophagus.    Erosions in the antrum compatible with erosive gastritis.    -No evidence of bleeding noted in stomach. .    -At the exit of duodenal bulb a 2cm clean based ulcer with raised edematous and  erythematous margins was noted on the right wall. No active bleeding or high  risk stigmata noted. Injected Epinephrine on four walls. Also on the left wall a  1 cm clean based ulcer noted with no active bleeding or high risk stigmata of  bleeding. .    -Second portion of duodenum appeared normal. .     < end of copied text >    < from: CT Angio Chest PE Protocol w/ IV Cont (01.26.21 @ 00:33) >  Chest:  No evidence for acute pulmonary embolus.  No CT evidence of pneumonia.  Mild areas of interlobular septal thickening may reflect mild pulmonary edema.  Upper lobe predominant centrilobular emphysema.    Abdomen:  Questionable small outpouching at the proximal duodenum with wall thickening versus volume averaging with adjacent motion artifact. Small inflamed duodenal ulcer is not excluded.  Atrophic left kidney with heterogeneous enhancement and mild fullness of the distal left ureter. Please correlate with urinalysis to exclude left-sided urinary infection/pyelonephritis.    < end of copied text >  < from: Xray Chest 1 View- PORTABLE-Urgent (01.25.21 @ 17:47) >    No consolidation, effusion or pneumothorax.    < end of copied text >    ALLERGIES: No known drug, environmental, or food allergies.     ===========================================================      Assessment and Plan:   · Assessment	  89yo M, from home, with PMHx of PUD, T2DM, Fe deficiency anemia, presents for abdominal pain.     # Upper GI Bleed  - EGD (1/26): Duodenal ulcers w/ clean bases, no active bleeding  - As per GI:  - C/w Protonix drip for today 1/27  - Avoid NSAIDs  - Check H. pylori serologies, treat if positive  - Advance to clear liquid diet  - Hgb: 4.2 >> 7.5 (s/p 3U pRBC) >> 7.5 >> 8.6 (s/p 1U pRBC) >> 9.3    # New Onset Afib/Aflutter  - Troponins: .05 >> .07 >> .06 (1/27)  - EKG (1/25): Atrial flutter w/ 2:1 block, lateral infarct (age undetermined), ST&T wave abnormalities (consider inferior ischemia)   - EKG (1/27): AV block, sinus tachycardia, premature atrial complexes, nonspecific T-wave abnormality  - Start on Metoprolol 25mg q12  - As per PT:   - High risk for falls, recommends dispo home w/ PT or w/ assistance  - F/u GI for anticoagulation recs (risks may outweigh benefits given pt's GI bleed & advanced age)  - F/u Cardiology recs  - F/u TTE, TSH    # Hypoxia  - 1/26: EMS found pt sating at 80%, sat 99% on 3L NC in ED  - C/w 2L NC (sating 96%)    # ANISH - Likely from hypoperfusion secondary to anemia  - Cr: 2.2 >> 1.9  - BUN: 63 >>49    # DM  - Pt not on any home medications  - FS:   - F/u A1c    # HTN  - Pt reports no home medications; bp is WNL  - Start on Metoprolol 25mg q12    DVT PPx: None   GI PPX: Pantoprazole Infusion  Diet: Clear liquid diet   Activity: IAT (utilizes a RW at baseline, PT recommends dispo home w/ PT or w/ assist)  Dispo: Home  Code Status: DNR       DAILY PROGRESS NOTE  ===========================================================    Patient Information:  NINI LANIER  /  89yo  /  Male  /  MRN#: [id number]    Hospital Day: 1d     |:::::::::::::::::::::::::::| SUBJECTIVE |:::::::::::::::::::::::::::|    OVERNIGHT EVENTS: None  TODAY: Patient was seen today at bedside. Review of systems is negative.    |:::::::::::::::::::::::::::| OBJECTIVE |:::::::::::::::::::::::::::|    VITAL SIGNS: Last 24 Hours    Vital Signs Last 24 Hrs  T(C): 36.2 (27 Jan 2021 05:22), Max: 37.3 (26 Jan 2021 20:41)  T(F): 97.1 (27 Jan 2021 05:22), Max: 99.1 (26 Jan 2021 20:41)  HR: 110 (27 Jan 2021 09:26) (65 - 150)  BP: 135/97 (27 Jan 2021 08:25) (125/84 - 184/96)  BP(mean): --  RR: 19 (27 Jan 2021 05:22) (18 - 100)  SpO2: 98% (27 Jan 2021 07:43) (95% - 100%)    I&O's Summary    26 Jan 2021 07:01  -  27 Jan 2021 07:00  --------------------------------------------------------  IN: 900 mL / OUT: 1300 mL / NET: -400 mL      MEDICATIONS:  MEDICATIONS  (STANDING):  chlorhexidine 4% Liquid 1 Application(s) Topical <User Schedule>  metoprolol tartrate 25 milliGRAM(s) Oral two times a day  pantoprazole Infusion 8 mG/Hr (10 mL/Hr) IV Continuous <Continuous>    MEDICATIONS  (PRN):      PHYSICAL EXAM:  GENERAL: A&Ox3, NAD, alert  HEENT:  Conjunctivae pink, sclera anicteric, oral mucosa moist   CARDIO: Regular rate & rhythm, no murmurs, rubs or gallops  RESP:   No rales, wheezing, or rhonchi appreciated  GI: Soft nontender, nondistended, + bowel sounds   EXT: 2+ Peripheral pulses, no b/l edema   SKIN: No rashes or lesions    LAB RESULTS:                                   9.3    15.71 )-----------( 249      ( 27 Jan 2021 06:32 )             28.8   01-27    144  |  109  |  32<H>  ----------------------------<  116<H>  4.1   |  22  |  1.8<H>    Ca    8.8      27 Jan 2021 06:32  Mg     2.0     01-27    TPro  5.2<L>  /  Alb  3.4<L>  /  TBili  1.7<H>  /  DBili  x   /  AST  27  /  ALT  10  /  AlkPhos  62  01-27    Troponin T, Serum in AM (01.27.21 @ 06:32) Troponin T, Serum: 0.06: Critical value: ng/mL   Serum Pro-Brain Natriuretic Peptide (01.25.21 @ 16:33) Serum Pro-Brain Natriuretic Peptide: 3274 pg/mL     CAPILLARY BLOOD GLUCOSE      CAPILLARY BLOOD GLUCOSE  POCT Blood Glucose.: 131 mg/dL (27 Jan 2021 07:31)  POCT Blood Glucose.: 126 mg/dL (26 Jan 2021 22:48)  POCT Blood Glucose.: 160 mg/dL (26 Jan 2021 16:56)  POCT Blood Glucose.: 149 mg/dL (26 Jan 2021 11:20)      MICROBIOLOGY:    RADIOLOGY:    < from: EGD (01.26.21 @ 15:15) >  Impressions:    Normal mucosa in the whole esophagus.    Erosions in the antrum compatible with erosive gastritis.    -No evidence of bleeding noted in stomach. .    -At the exit of duodenal bulb a 2cm clean based ulcer with raised edematous and  erythematous margins was noted on the right wall. No active bleeding or high  risk stigmata noted. Injected Epinephrine on four walls. Also on the left wall a  1 cm clean based ulcer noted with no active bleeding or high risk stigmata of  bleeding. .    -Second portion of duodenum appeared normal. .     < end of copied text >    < from: CT Angio Chest PE Protocol w/ IV Cont (01.26.21 @ 00:33) >  Chest:  No evidence for acute pulmonary embolus.  No CT evidence of pneumonia.  Mild areas of interlobular septal thickening may reflect mild pulmonary edema.  Upper lobe predominant centrilobular emphysema.    Abdomen:  Questionable small outpouching at the proximal duodenum with wall thickening versus volume averaging with adjacent motion artifact. Small inflamed duodenal ulcer is not excluded.  Atrophic left kidney with heterogeneous enhancement and mild fullness of the distal left ureter. Please correlate with urinalysis to exclude left-sided urinary infection/pyelonephritis.    < end of copied text >  < from: Xray Chest 1 View- PORTABLE-Urgent (01.25.21 @ 17:47) >    No consolidation, effusion or pneumothorax.    < end of copied text >    ALLERGIES: No known drug, environmental, or food allergies.     ===========================================================      Assessment and Plan:   · Assessment	  89yo M, from home, with PMHx of PUD, T2DM, Fe deficiency anemia, presents for abdominal pain.     # Upper GI Bleed  - EGD (1/26): Duodenal ulcers w/ clean bases, no active bleeding  - As per GI:  - C/w Protonix drip for today 1/27  - Avoid NSAIDs  - Check H. pylori serologies, treat if positive  - Advance to clear liquid diet  - Hgb: 4.2 >> 7.5 (s/p 3U pRBC) >> 7.5 >> 8.6 (s/p 1U pRBC) >> 9.3    # New Onset Afib/Aflutter  - Troponins: .05 >> .07 >> .06 (1/27)  - EKG (1/25): Atrial flutter w/ 2:1 block, lateral infarct (age undetermined), ST&T wave abnormalities (consider inferior ischemia)   - EKG (1/27): AV block, sinus tachycardia, premature atrial complexes, nonspecific T-wave abnormality  - Start on Metoprolol 25mg q12  - As per PT:   - High risk for falls, recommends dispo home w/ PT or w/ assistance  - As per GI:  - Recommends Heparin (risks may outweigh benefits given pt's GI bleed & advanced age)  - F/u Cardiology recs  - F/u TTE, TSH    # Hypoxia  - 1/26: EMS found pt sating at 80%, sat 99% on 3L NC in ED  - C/w 2L NC (sating 96%)    # ANISH - Likely from hypoperfusion secondary to anemia  - Cr: 2.2 >> 1.9  - BUN: 63 >>49    # DM  - Pt not on any home medications  - FS:   - F/u A1c    # HTN  - Pt reports no home medications; bp is WNL  - Start on Metoprolol 25mg q12    DVT PPx: None   GI PPX: Pantoprazole Infusion  Diet: Clear liquid diet   Activity: IAT (utilizes a RW at baseline, PT recommends dispo home w/ PT or w/ assist)  Dispo: Home  Code Status: DNR       DAILY PROGRESS NOTE  ===========================================================    Patient Information:  NINI LANIER  /  89yo  /  Male  /  MRN#: [id number]    Hospital Day: 1d     |:::::::::::::::::::::::::::| SUBJECTIVE |:::::::::::::::::::::::::::|    OVERNIGHT EVENTS: None  TODAY: Patient was seen today at bedside. Review of systems is negative.    |:::::::::::::::::::::::::::| OBJECTIVE |:::::::::::::::::::::::::::|    VITAL SIGNS: Last 24 Hours    Vital Signs Last 24 Hrs  T(C): 36.2 (27 Jan 2021 05:22), Max: 37.3 (26 Jan 2021 20:41)  T(F): 97.1 (27 Jan 2021 05:22), Max: 99.1 (26 Jan 2021 20:41)  HR: 110 (27 Jan 2021 09:26) (65 - 150)  BP: 135/97 (27 Jan 2021 08:25) (125/84 - 184/96)  BP(mean): --  RR: 19 (27 Jan 2021 05:22) (18 - 100)  SpO2: 98% (27 Jan 2021 07:43) (95% - 100%)    I&O's Summary    26 Jan 2021 07:01  -  27 Jan 2021 07:00  --------------------------------------------------------  IN: 900 mL / OUT: 1300 mL / NET: -400 mL      MEDICATIONS:  MEDICATIONS  (STANDING):  chlorhexidine 4% Liquid 1 Application(s) Topical <User Schedule>  metoprolol tartrate 25 milliGRAM(s) Oral two times a day  pantoprazole Infusion 8 mG/Hr (10 mL/Hr) IV Continuous <Continuous>    MEDICATIONS  (PRN):      PHYSICAL EXAM:  GENERAL: A&Ox3, NAD, alert  HEENT:  Conjunctivae pink, sclera anicteric, oral mucosa moist   CARDIO: Regular rate & rhythm, no murmurs, rubs or gallops  RESP:   No rales, wheezing, or rhonchi appreciated  GI: Soft nontender, nondistended, + bowel sounds   EXT: 2+ Peripheral pulses, no b/l edema   SKIN: No rashes or lesions    LAB RESULTS:                                   9.3    15.71 )-----------( 249      ( 27 Jan 2021 06:32 )             28.8   01-27    144  |  109  |  32<H>  ----------------------------<  116<H>  4.1   |  22  |  1.8<H>    Ca    8.8      27 Jan 2021 06:32  Mg     2.0     01-27    TPro  5.2<L>  /  Alb  3.4<L>  /  TBili  1.7<H>  /  DBili  x   /  AST  27  /  ALT  10  /  AlkPhos  62  01-27    Troponin T, Serum in AM (01.27.21 @ 06:32) Troponin T, Serum: 0.06: Critical value: ng/mL   Serum Pro-Brain Natriuretic Peptide (01.25.21 @ 16:33) Serum Pro-Brain Natriuretic Peptide: 3274 pg/mL     CAPILLARY BLOOD GLUCOSE      CAPILLARY BLOOD GLUCOSE  POCT Blood Glucose.: 131 mg/dL (27 Jan 2021 07:31)  POCT Blood Glucose.: 126 mg/dL (26 Jan 2021 22:48)  POCT Blood Glucose.: 160 mg/dL (26 Jan 2021 16:56)  POCT Blood Glucose.: 149 mg/dL (26 Jan 2021 11:20)      MICROBIOLOGY:    RADIOLOGY:    < from: EGD (01.26.21 @ 15:15) >  Impressions:    Normal mucosa in the whole esophagus.    Erosions in the antrum compatible with erosive gastritis.    -No evidence of bleeding noted in stomach. .    -At the exit of duodenal bulb a 2cm clean based ulcer with raised edematous and  erythematous margins was noted on the right wall. No active bleeding or high  risk stigmata noted. Injected Epinephrine on four walls. Also on the left wall a  1 cm clean based ulcer noted with no active bleeding or high risk stigmata of  bleeding. .    -Second portion of duodenum appeared normal. .     < end of copied text >    < from: CT Angio Chest PE Protocol w/ IV Cont (01.26.21 @ 00:33) >  Chest:  No evidence for acute pulmonary embolus.  No CT evidence of pneumonia.  Mild areas of interlobular septal thickening may reflect mild pulmonary edema.  Upper lobe predominant centrilobular emphysema.    Abdomen:  Questionable small outpouching at the proximal duodenum with wall thickening versus volume averaging with adjacent motion artifact. Small inflamed duodenal ulcer is not excluded.  Atrophic left kidney with heterogeneous enhancement and mild fullness of the distal left ureter. Please correlate with urinalysis to exclude left-sided urinary infection/pyelonephritis.    < end of copied text >  < from: Xray Chest 1 View- PORTABLE-Urgent (01.25.21 @ 17:47) >    No consolidation, effusion or pneumothorax.    < end of copied text >    ALLERGIES: No known drug, environmental, or food allergies.     ===========================================================      Assessment and Plan:   · Assessment	  89yo M, from home, with PMHx of PUD, T2DM, Fe deficiency anemia, presents for abdominal pain.     # Upper GI Bleed  - EGD (1/26): Duodenal ulcers w/ clean bases, no active bleeding  - As per GI:  - C/w Protonix drip for today 1/27  - Avoid NSAIDs  - Check H. pylori serologies, treat if positive  - Advance to clear liquid diet  - Hgb: 4.2 >> 7.5 (s/p 3U pRBC) >> 7.5 >> 8.6 (s/p 1U pRBC) >> 9.3    # New Onset Afib/Aflutter  - Troponins: .05 >> .07 >> .06 (1/27)  - CHADS-VASc = 3 (Age, and questionable hx of HTN/ DM) HAS-BLED = 2  - EKG (1/25): Atrial flutter w/ 2:1 block, lateral infarct (age undetermined), ST&T wave abnormalities (consider inferior ischemia)   - EKG (1/27): AV block, sinus tachycardia, premature atrial complexes, nonspecific T-wave abnormality  - Start on Metoprolol 25mg q12  - As per PT:   - High risk for falls, recommends dispo home w/ PT or w/ assistance  - As per GI:  - Recommends Heparin if AC needed (risks may outweigh benefits given pt's GI bleed & advanced age)  - F/u Cardiology recs  - F/u TTE, TSH    # Hypoxia  - 1/26: EMS found pt sating at 80%, sat 99% on 3L NC in ED  - C/w 2L NC (sating 96%)    # ANISH - Likely from hypoperfusion secondary to anemia  - Cr: 2.2 >> 1.9  - BUN: 63 >>49    # DM  - Pt not on any home medications  - FS:   - F/u A1c    # HTN  - Pt reports no home medications; bp is WNL  - Start on Metoprolol 25mg q12    DVT PPx: None   GI PPX: Pantoprazole Infusion  Diet: Clear liquid diet   Activity: IAT (utilizes a RW at baseline, PT recommends dispo home w/ PT or w/ assist)  Dispo: Home  Code Status: DNR

## 2021-01-27 NOTE — PHYSICAL THERAPY INITIAL EVALUATION ADULT - GAIT DISTANCE, PT EVAL
(40 ft total); 10 ft, restroom, 30ft; SpO2 assessed post ambulation: 85% on RA, resat to 99% after 1 min sitting rest break.

## 2021-01-27 NOTE — PHYSICAL THERAPY INITIAL EVALUATION ADULT - PERTINENT HX OF CURRENT PROBLEM, REHAB EVAL
89 year old male with PMHx of Peptic ulcer disease (>60 years ago), HTN, DM2 (not on any meds), iron deficiency anemia sent in due to hypoxia.

## 2021-01-27 NOTE — PHYSICAL THERAPY INITIAL EVALUATION ADULT - PHYSICAL ASSIST/NONPHYSICAL ASSIST: SIT/STAND, REHAB EVAL
VC for hand placement prior to stand, and to maintain upright posture in stand/verbal cues/1 person assist

## 2021-01-27 NOTE — PROGRESS NOTE ADULT - SUBJECTIVE AND OBJECTIVE BOX
no chest pain   no sob   feels ok     Vital Signs Last 24 Hrs  T(C): 36.2 (27 Jan 2021 05:22), Max: 37.3 (26 Jan 2021 20:41)  T(F): 97.1 (27 Jan 2021 05:22), Max: 99.1 (26 Jan 2021 20:41)  HR: 110 (27 Jan 2021 10:42) (65 - 150)  BP: 138/80 (27 Jan 2021 10:42) (135/97 - 184/96)  BP(mean): --  RR: 19 (27 Jan 2021 05:22) (18 - 100)  SpO2: 98% (27 Jan 2021 07:43) (95% - 100%)    PHYSICAL EXAM:  GENERAL: NAD  Pulm Clear to auscultation bilaterally; No wheeze  CV: s1,s2 tachy   GI: Soft, Nontender, Nondistended; Bowel sounds present  EXTREMITIES:  2+ Peripheral Pulses, No clubbing, cyanosis, or edema  PSYCH: AAOx3  NEUROLOGY: non-focal  SKIN: No rashes                          9.3    15.71 )-----------( 249      ( 27 Jan 2021 06:32 )             28.8     01-27    144  |  109  |  32<H>  ----------------------------<  116<H>  4.1   |  22  |  1.8<H>    Ca    8.8      27 Jan 2021 06:32  Mg     2.0     01-27    TPro  5.2<L>  /  Alb  3.4<L>  /  TBili  1.7<H>  /  DBili  x   /  AST  27  /  ALT  10  /  AlkPhos  62  01-27    LIVER FUNCTIONS - ( 27 Jan 2021 06:32 )  Alb: 3.4 g/dL / Pro: 5.2 g/dL / ALK PHOS: 62 U/L / ALT: 10 U/L / AST: 27 U/L / GGT: x           PT/INR - ( 26 Jan 2021 11:59 )   PT: 11.10 sec;   INR: 0.97 ratio         PTT - ( 26 Jan 2021 11:59 )  PTT:24.3 sec  CARDIAC MARKERS ( 27 Jan 2021 06:32 )  x     / 0.06 ng/mL / x     / x     / x      CARDIAC MARKERS ( 26 Jan 2021 11:59 )  x     / 0.07 ng/mL / x     / x     / x      CARDIAC MARKERS ( 25 Jan 2021 16:33 )  x     / 0.05 ng/mL / x     / x     / x          MEDICATIONS  (STANDING):  chlorhexidine 4% Liquid 1 Application(s) Topical <User Schedule>  metoprolol tartrate 25 milliGRAM(s) Oral two times a day  pantoprazole Infusion 8 mG/Hr (10 mL/Hr) IV Continuous <Continuous>    MEDICATIONS  (PRN):

## 2021-01-27 NOTE — OCCUPATIONAL THERAPY INITIAL EVALUATION ADULT - TRANSFER TRAINING, PT EVAL
Patient will perform bed <>chair transfer with supervision with approppriate assistive device by discharge.

## 2021-01-27 NOTE — PROGRESS NOTE ADULT - SUBJECTIVE AND OBJECTIVE BOX
Gastroenterology progress note:     Patient is a 89y old  Male who presents with a chief complaint of Hypoxia (26 Jan 2021 07:14)       Admitted on: 01-26-21    We are following the patient for melena and acute onset anemia     Interval History: s/p EGD 1/27/21, duodenal ulcers, non bleeding. patient tolerated clear liquid diet, reports having 2 BM yesterday but the nurse reported none.    Patient's medical problems are stable   Prior records reviewed (Y/N): Y  History obtained from someone other than patient (Y/N): nurse      PAST MEDICAL & SURGICAL HISTORY:  Peptic ulcer    Type 2 diabetes mellitus    Hypertension        MEDICATIONS  (STANDING):  chlorhexidine 4% Liquid 1 Application(s) Topical <User Schedule>  pantoprazole Infusion 8 mG/Hr (10 mL/Hr) IV Continuous <Continuous>    MEDICATIONS  (PRN):      Allergies  No Known Allergies      Review of Systems:   General: no fever  HEENT: no hemoptysis  Cardiovascular:  No Chest Pain, No Palpitations  Respiratory:  No Cough, No Dyspnea  Gastrointestinal:  As described in HPI  Hematology: no bruising or hematoma   Neurology: no new motor deficit  Skin: no new rash    Physical Examination:  T(C): 36.2 (01-27-21 @ 05:22), Max: 37.3 (01-26-21 @ 20:41)  HR: 83 (01-27-21 @ 05:22) (65 - 118)  BP: 137/72 (01-27-21 @ 05:22) (125/84 - 184/96)  RR: 19 (01-27-21 @ 05:22) (18 - 100)  SpO2: 98% (01-27-21 @ 07:43) (95% - 100%)  Weight (kg): 66 (01-26-21 @ 14:51)    01-26-21 @ 07:01  -  01-27-21 @ 07:00  --------------------------------------------------------  IN: 900 mL / OUT: 1300 mL / NET: -400 mL        Constitutional: No acute distress.  Head: normocephalic  Neck: no palpable thyroid  Eyes: EOMI  Respiratory:  No signs of respiratory distress. Lung sounds are clear bilaterally.  Cardiovascular:  S1 S2, Regular rate and rhythm.  Abdominal: Abdomen is soft, symmetric, and non-tender without distention.    Extremities: no pitting edema  Skin: No rashes, No Jaundice.        Data: (reviewed by attending)                        9.3    15.71 )-----------( 249      ( 27 Jan 2021 06:32 )             28.8     Hgb trend:  9.3  01-27-21 @ 06:32  8.6  01-26-21 @ 23:10  7.5  01-26-21 @ 11:59  7.5  01-26-21 @ 11:20  4.2  01-25-21 @ 16:33        01-27    144  |  109  |  32<H>  ----------------------------<  116<H>  4.1   |  22  |  1.8<H>    Ca    8.8      27 Jan 2021 06:32  Mg     2.0     01-27    TPro  5.2<L>  /  Alb  3.4<L>  /  TBili  1.7<H>  /  DBili  x   /  AST  27  /  ALT  10  /  AlkPhos  62  01-27    Liver panel trend:  TBili 1.7   /   AST 27   /   ALT 10   /   AlkP 62   /   Tptn 5.2   /   Alb 3.4    /   DBili --      01-27  TBili 1.4   /   AST 20   /   ALT 9   /   AlkP 60   /   Tptn 5.2   /   Alb 3.4    /   DBili --      01-26  TBili 0.2   /   AST 21   /   ALT 8   /   AlkP 58   /   Tptn 5.2   /   Alb 3.5    /   DBili --      01-25      PT/INR - ( 26 Jan 2021 11:59 )   PT: 11.10 sec;   INR: 0.97 ratio         PTT - ( 26 Jan 2021 11:59 )  PTT:24.3 sec       Radiology: (reviewed by attending)  none new

## 2021-01-27 NOTE — PHYSICAL THERAPY INITIAL EVALUATION ADULT - GAIT TRAINING, PT EVAL
pt will ambulate 100ft with RW independently by d/c / Pt will negotiate 6 steps with close supervision and hand rails by d/c

## 2021-01-27 NOTE — PHYSICAL THERAPY INITIAL EVALUATION ADULT - GENERAL OBSERVATIONS, REHAB EVAL
PT IE completed [id number]. Chart reviewed and case discussed with ZEV Farias. Pt encountered semi-reclined in bed in NAD, + IV line (disconnected by RN for session), + tele, + O2 via NC @ 2L/min, agreeable to session. SpO2 assessed @ rest: 96% on 2 L/min. Pt tolerated session well with no adverse response. ZEV Farias made aware of pt's current functional status.

## 2021-01-27 NOTE — OCCUPATIONAL THERAPY INITIAL EVALUATION ADULT - ADL RETRAINING, OT EVAL
Patient will perform lower body dressing with supervision with use of appropriate adaptive equipment as needed by discharge.

## 2021-01-28 LAB
ALBUMIN SERPL ELPH-MCNC: 2.9 G/DL — LOW (ref 3.5–5.2)
ALP SERPL-CCNC: 66 U/L — SIGNIFICANT CHANGE UP (ref 30–115)
ALT FLD-CCNC: 10 U/L — SIGNIFICANT CHANGE UP (ref 0–41)
ANION GAP SERPL CALC-SCNC: 10 MMOL/L — SIGNIFICANT CHANGE UP (ref 7–14)
AST SERPL-CCNC: 19 U/L — SIGNIFICANT CHANGE UP (ref 0–41)
BASOPHILS # BLD AUTO: 0.02 K/UL — SIGNIFICANT CHANGE UP (ref 0–0.2)
BASOPHILS NFR BLD AUTO: 0.2 % — SIGNIFICANT CHANGE UP (ref 0–1)
BILIRUB SERPL-MCNC: 1.4 MG/DL — HIGH (ref 0.2–1.2)
BLD GP AB SCN SERPL QL: SIGNIFICANT CHANGE UP
BUN SERPL-MCNC: 25 MG/DL — HIGH (ref 10–20)
CALCIUM SERPL-MCNC: 7.8 MG/DL — LOW (ref 8.5–10.1)
CHLORIDE SERPL-SCNC: 105 MMOL/L — SIGNIFICANT CHANGE UP (ref 98–110)
CO2 SERPL-SCNC: 25 MMOL/L — SIGNIFICANT CHANGE UP (ref 17–32)
CREAT SERPL-MCNC: 1.8 MG/DL — HIGH (ref 0.7–1.5)
EOSINOPHIL # BLD AUTO: 0.17 K/UL — SIGNIFICANT CHANGE UP (ref 0–0.7)
EOSINOPHIL NFR BLD AUTO: 1.4 % — SIGNIFICANT CHANGE UP (ref 0–8)
GLUCOSE BLDC GLUCOMTR-MCNC: 136 MG/DL — HIGH (ref 70–99)
GLUCOSE BLDC GLUCOMTR-MCNC: 141 MG/DL — HIGH (ref 70–99)
GLUCOSE BLDC GLUCOMTR-MCNC: 154 MG/DL — HIGH (ref 70–99)
GLUCOSE BLDC GLUCOMTR-MCNC: 173 MG/DL — HIGH (ref 70–99)
GLUCOSE SERPL-MCNC: 119 MG/DL — HIGH (ref 70–99)
H PYLORI AB SER-ACNC: 11.2 UNITS — SIGNIFICANT CHANGE UP
H PYLORI AB SER-ACNC: 11.9 UNITS — SIGNIFICANT CHANGE UP
HCT VFR BLD CALC: 25.8 % — LOW (ref 42–52)
HGB BLD-MCNC: 8.6 G/DL — LOW (ref 14–18)
IMM GRANULOCYTES NFR BLD AUTO: 0.9 % — HIGH (ref 0.1–0.3)
LYMPHOCYTES # BLD AUTO: 0.28 K/UL — LOW (ref 1.2–3.4)
LYMPHOCYTES # BLD AUTO: 2.4 % — LOW (ref 20.5–51.1)
MAGNESIUM SERPL-MCNC: 1.9 MG/DL — SIGNIFICANT CHANGE UP (ref 1.8–2.4)
MCHC RBC-ENTMCNC: 27.6 PG — SIGNIFICANT CHANGE UP (ref 27–31)
MCHC RBC-ENTMCNC: 33.3 G/DL — SIGNIFICANT CHANGE UP (ref 32–37)
MCV RBC AUTO: 82.7 FL — SIGNIFICANT CHANGE UP (ref 80–94)
MONOCYTES # BLD AUTO: 0.99 K/UL — HIGH (ref 0.1–0.6)
MONOCYTES NFR BLD AUTO: 8.3 % — SIGNIFICANT CHANGE UP (ref 1.7–9.3)
NEUTROPHILS # BLD AUTO: 10.32 K/UL — HIGH (ref 1.4–6.5)
NEUTROPHILS NFR BLD AUTO: 86.8 % — HIGH (ref 42.2–75.2)
NRBC # BLD: 2 /100 WBCS — HIGH (ref 0–0)
PLATELET # BLD AUTO: 253 K/UL — SIGNIFICANT CHANGE UP (ref 130–400)
POTASSIUM SERPL-MCNC: 3.6 MMOL/L — SIGNIFICANT CHANGE UP (ref 3.5–5)
POTASSIUM SERPL-SCNC: 3.6 MMOL/L — SIGNIFICANT CHANGE UP (ref 3.5–5)
PROT SERPL-MCNC: 4.7 G/DL — LOW (ref 6–8)
RBC # BLD: 3.12 M/UL — LOW (ref 4.7–6.1)
RBC # FLD: 18.6 % — HIGH (ref 11.5–14.5)
SODIUM SERPL-SCNC: 140 MMOL/L — SIGNIFICANT CHANGE UP (ref 135–146)
WBC # BLD: 11.89 K/UL — HIGH (ref 4.8–10.8)
WBC # FLD AUTO: 11.89 K/UL — HIGH (ref 4.8–10.8)

## 2021-01-28 PROCEDURE — 99233 SBSQ HOSP IP/OBS HIGH 50: CPT

## 2021-01-28 PROCEDURE — 93010 ELECTROCARDIOGRAM REPORT: CPT

## 2021-01-28 PROCEDURE — 99232 SBSQ HOSP IP/OBS MODERATE 35: CPT

## 2021-01-28 RX ORDER — METOPROLOL TARTRATE 50 MG
25 TABLET ORAL ONCE
Refills: 0 | Status: COMPLETED | OUTPATIENT
Start: 2021-01-28 | End: 2021-01-28

## 2021-01-28 RX ORDER — METOPROLOL TARTRATE 50 MG
50 TABLET ORAL
Refills: 0 | Status: DISCONTINUED | OUTPATIENT
Start: 2021-01-28 | End: 2021-01-29

## 2021-01-28 RX ORDER — METOPROLOL TARTRATE 50 MG
50 TABLET ORAL
Refills: 0 | Status: DISCONTINUED | OUTPATIENT
Start: 2021-01-28 | End: 2021-01-28

## 2021-01-28 RX ORDER — PANTOPRAZOLE SODIUM 20 MG/1
40 TABLET, DELAYED RELEASE ORAL
Refills: 0 | Status: DISCONTINUED | OUTPATIENT
Start: 2021-01-28 | End: 2021-01-29

## 2021-01-28 RX ADMIN — Medication 50 MILLIGRAM(S): at 18:08

## 2021-01-28 RX ADMIN — PANTOPRAZOLE SODIUM 40 MILLIGRAM(S): 20 TABLET, DELAYED RELEASE ORAL at 18:08

## 2021-01-28 RX ADMIN — CHLORHEXIDINE GLUCONATE 1 APPLICATION(S): 213 SOLUTION TOPICAL at 05:54

## 2021-01-28 RX ADMIN — Medication 25 MILLIGRAM(S): at 15:06

## 2021-01-28 RX ADMIN — Medication 25 MILLIGRAM(S): at 05:56

## 2021-01-28 NOTE — PROGRESS NOTE ADULT - SUBJECTIVE AND OBJECTIVE BOX
no overnight events   no chest pain   no sob   no melena   no nausea and no vomiting     Vital Signs Last 24 Hrs  T(C): 37.4 (28 Jan 2021 05:50), Max: 37.4 (28 Jan 2021 05:50)  T(F): 99.4 (28 Jan 2021 05:50), Max: 99.4 (28 Jan 2021 05:50)  HR: 98 (28 Jan 2021 05:50) (75 - 110)  BP: 132/71 (28 Jan 2021 05:50) (112/58 - 164/86)  BP(mean): --  RR: 18 (28 Jan 2021 05:50) (18 - 18)  SpO2: --    PHYSICAL EXAM:  GENERAL: NAD, well-developed  HEAD:  Atraumatic, Normocephalic  EYES: EOMI, PERRLA, conjunctiva and sclera clear  NECK: Supple, No JVD  CHEST/LUNG: Clear to auscultation bilaterally; No wheeze  HEART: Regular rate and rhythm; No murmurs, rubs, or gallops  ABDOMEN: Soft, Nontender, Nondistended; Bowel sounds present  EXTREMITIES:  2+ Peripheral Pulses, No clubbing, cyanosis, or edema  PSYCH: AAOx3  NEUROLOGY: non-focal  SKIN: No rashes or lesions                          8.6    11.89 )-----------( 253      ( 28 Jan 2021 06:29 )             25.8     01-28    140  |  105  |  25<H>  ----------------------------<  119<H>  3.6   |  25  |  1.8<H>    Ca    7.8<L>      28 Jan 2021 06:29  Mg     1.9     01-28    TPro  4.7<L>  /  Alb  2.9<L>  /  TBili  1.4<H>  /  DBili  x   /  AST  19  /  ALT  10  /  AlkPhos  66  01-28    LIVER FUNCTIONS - ( 28 Jan 2021 06:29 )  Alb: 2.9 g/dL / Pro: 4.7 g/dL / ALK PHOS: 66 U/L / ALT: 10 U/L / AST: 19 U/L / GGT: x           PT/INR - ( 26 Jan 2021 11:59 )   PT: 11.10 sec;   INR: 0.97 ratio         PTT - ( 26 Jan 2021 11:59 )  PTT:24.3 sec  CARDIAC MARKERS ( 27 Jan 2021 18:34 )  x     / 0.04 ng/mL / x     / x     / x      CARDIAC MARKERS ( 27 Jan 2021 06:32 )  x     / 0.06 ng/mL / x     / x     / x      CARDIAC MARKERS ( 26 Jan 2021 11:59 )  x     / 0.07 ng/mL / x     / x     / x        MEDICATIONS  (STANDING):  chlorhexidine 4% Liquid 1 Application(s) Topical <User Schedule>  metoprolol tartrate 50 milliGRAM(s) Oral two times a day  pantoprazole    Tablet 40 milliGRAM(s) Oral two times a day    MEDICATIONS  (PRN):       no overnight events   no chest pain   no sob   no melena   no nausea and no vomiting     Vital Signs Last 24 Hrs  T(C): 37.4 (28 Jan 2021 05:50), Max: 37.4 (28 Jan 2021 05:50)  T(F): 99.4 (28 Jan 2021 05:50), Max: 99.4 (28 Jan 2021 05:50)  HR: 98 (28 Jan 2021 05:50) (75 - 110)  BP: 132/71 (28 Jan 2021 05:50) (112/58 - 164/86)  BP(mean): --  RR: 18 (28 Jan 2021 05:50) (18 - 18)  SpO2: --    PHYSICAL EXAM:  GENERAL: NAD,  Pulm Clear to auscultation bilaterally; No wheeze  CV: IRRegular rate and rhythm;  GI: Soft, Nontender, Nondistended; Bowel sounds present  EXTREMITIES:  2+ Peripheral Pulses, No clubbing, cyanosis, or edema  PSYCH: AAOx3  NEUROLOGY: non-focal  SKIN: No rashes                           8.6    11.89 )-----------( 253      ( 28 Jan 2021 06:29 )             25.8     01-28    140  |  105  |  25<H>  ----------------------------<  119<H>  3.6   |  25  |  1.8<H>    Ca    7.8<L>      28 Jan 2021 06:29  Mg     1.9     01-28    TPro  4.7<L>  /  Alb  2.9<L>  /  TBili  1.4<H>  /  DBili  x   /  AST  19  /  ALT  10  /  AlkPhos  66  01-28    LIVER FUNCTIONS - ( 28 Jan 2021 06:29 )  Alb: 2.9 g/dL / Pro: 4.7 g/dL / ALK PHOS: 66 U/L / ALT: 10 U/L / AST: 19 U/L / GGT: x           PT/INR - ( 26 Jan 2021 11:59 )   PT: 11.10 sec;   INR: 0.97 ratio         PTT - ( 26 Jan 2021 11:59 )  PTT:24.3 sec  CARDIAC MARKERS ( 27 Jan 2021 18:34 )  x     / 0.04 ng/mL / x     / x     / x      CARDIAC MARKERS ( 27 Jan 2021 06:32 )  x     / 0.06 ng/mL / x     / x     / x      CARDIAC MARKERS ( 26 Jan 2021 11:59 )  x     / 0.07 ng/mL / x     / x     / x        MEDICATIONS  (STANDING):  chlorhexidine 4% Liquid 1 Application(s) Topical <User Schedule>  metoprolol tartrate 50 milliGRAM(s) Oral two times a day  pantoprazole    Tablet 40 milliGRAM(s) Oral two times a day    MEDICATIONS  (PRN):

## 2021-01-28 NOTE — DISCHARGE NOTE PROVIDER - CARE PROVIDER_API CALL
Pratik Zelaya)  Gastroenterology; Internal Medicine  [address] [place] [address]  Phone: [phone]  Fax: [phone]  Follow Up Time: 2 weeks    Christina ESQUIVEL, Primary Care Doctor  Phone: [phone]  Fax: (   )    -  Follow Up Time: Routine

## 2021-01-28 NOTE — PROGRESS NOTE ADULT - ASSESSMENT
#Upper GI bleed   s/p EGD < from: EGD (01.26.21 @ 15:15) > Impressions:  Normal mucosa in the whole esophagus.  Erosions in the antrum compatible with erosive gastritis. No evidence of bleeding noted in stomach. At the exit of duodenal bulb a 2cm clean based ulcer with raised edematous and erythematous margins was noted on the right wall. No active bleeding or high risk stigmata noted. Injected Epinephrine on four walls. Also on the left wall a 1 cm clean based ulcer noted with no active bleeding or high risk stigmata of bleeding. Second portion of duodenum appeared normal.   change to po protonix 40 BID   advance diet to soft       # aflutter on initial EKG CHADSVAC is 2: HTN, does not seem like he has DM as A1c not in diabetic range   increase lopressor to 50 BID   given advanced age, GI bleed as above requiring 4 units of PRBC from PUD and and also seen by PT deemed high risk for falls. risks of anticoagulation outweigh benefits. I spoke to daughter  about this and she does not want her father to be on any kind of blood thinners   no need for echo as it will not      #leukocytosis no signs of infection. improved     #ANISH in the setting of anemia and GI improving likely has underlying unknown stage of CKD     #HTN: controlled       monitor on tele for rate control for 24 hours  #Upper GI bleed   s/p EGD < from: EGD (01.26.21 @ 15:15) > Impressions:  Normal mucosa in the whole esophagus.  Erosions in the antrum compatible with erosive gastritis. No evidence of bleeding noted in stomach. At the exit of duodenal bulb a 2cm clean based ulcer with raised edematous and erythematous margins was noted on the right wall. No active bleeding or high risk stigmata noted. Injected Epinephrine on four walls. Also on the left wall a 1 cm clean based ulcer noted with no active bleeding or high risk stigmata of bleeding. Second portion of duodenum appeared normal.   change to po protonix 40 BID   advance diet to soft       # aflutter on initial EKG CHADSVAC is 2: HTN, does not seem like he has DM as A1c not in diabetic range   increase lopressor to 50 BID   given advanced age, GI bleed as above requiring 4 units of PRBC from PUD and and also seen by PT deemed high risk for falls. risks of anticoagulation outweigh benefits. I spoke to daughter  about this and she does not want her father to be on any kind of blood thinners   no need for echo as it will not      #leukocytosis no signs of infection. improved     #ANISH in the setting of anemia and GI improving likely has underlying unknown stage of CKD     #HTN: controlled       monitor on tele for rate control for 24 hours and assure hgb stable by tomorrow   daughter wants to take patient home when ready

## 2021-01-28 NOTE — PROGRESS NOTE ADULT - ASSESSMENT
89 year old male with PMHx of Peptic ulcer disease (>60 years ago), HTN, DM2 (not on any meds), iron deficiency anemia? sent in due to hypoxia. Patient lives alone, daughter last reached out to him Friday, could not reach him since, so today told friend to check on him, they got to the house, patient was too weak could not open door chain, so they called EMS. EMS found patient to be hypoxic to the 80s, he was complaining of feeling weak. patient has been complaining of melena for 2 weeks, twice a day loose black stool and abdominal pain. patient reports having EGD in the past and was diagnosed with PUD? he is on PPI at home.  In ED patient tachy to 130s, BP 59/35 on admission currently tachycardic 107, /79. Hbg 4.2 MCV 82.5 tof=197  BUN=63 Cr=2.2, no baseline labs, TEDDY + melena, s/p 4 units PRBCs total.  s/p EGD < from: EGD (01.26.21 @ 15:15) > Impressions:  Normal mucosa in the whole esophagus.  Erosions in the antrum compatible with erosive gastritis. No evidence of bleeding noted in stomach. At the exit of duodenal bulb a 2cm clean based ulcer with raised edematous and erythematous margins was noted on the right wall. No active bleeding or high risk stigmata noted. Injected Epinephrine on four walls. Also on the left wall a 1 cm clean based ulcer noted with no active bleeding or high risk stigmata of bleeding. Second portion of duodenum appeared normal.     *Melena 2/2 duodenal ulcers   -Hb stable 8-9  -new atrial flutter but decision with family was not to proceed with anticoagulation     Plan  -Monitor CBC and transfuse as needed  -switched to po bid by medical team, continue   -Avoid NSAIDS  -Check H.pylori serologies and treat if positive, patient will be on PPI for life and stool Ag test for HP can be falsely negative on PPI  -2 large bore IV  -keep active type and screen  -can advance diet    -call as needed, [id number] during weekdays till 5pm and call GI service after 5pm and on weekends [phone]  -Follow up with our [facility] located at [address] [place] [address]. Phone Number: [phone]   89 year old male with PMHx of Peptic ulcer disease (>60 years ago), HTN, DM2 (not on any meds), iron deficiency anemia? sent in due to hypoxia. Patient lives alone, daughter last reached out to him Friday, could not reach him since, so today told friend to check on him, they got to the house, patient was too weak could not open door chain, so they called EMS. EMS found patient to be hypoxic to the 80s, he was complaining of feeling weak. patient has been complaining of melena for 2 weeks, twice a day loose black stool and abdominal pain. patient reports having EGD in the past and was diagnosed with PUD? he is on PPI at home.  In ED patient tachy to 130s, BP 59/35 on admission currently tachycardic 107, /79. Hbg 4.2 MCV 82.5 vyp=277  BUN=63 Cr=2.2, no baseline labs, TEDDY + melena, s/p 4 units PRBCs total.  s/p EGD < from: EGD (01.26.21 @ 15:15) > Impressions:  Normal mucosa in the whole esophagus.  Erosions in the antrum compatible with erosive gastritis. No evidence of bleeding noted in stomach. At the exit of duodenal bulb a 2cm clean based ulcer with raised edematous and erythematous margins was noted on the right wall. No active bleeding or high risk stigmata noted. Injected Epinephrine on four walls. Also on the left wall a 1 cm clean based ulcer noted with no active bleeding or high risk stigmata of bleeding. Second portion of duodenum appeared normal.     *Melena 2/2 duodenal ulcers   -Hb stable 8-9  -new atrial flutter but decision with family was not to proceed with anticoagulation     Plan  -Monitor CBC and transfuse as needed  -switched to po bid by medical team, continue   -Avoid NSAIDS  -H.pylori serologies pending  -2 large bore IV  -keep active type and screen  -can advance diet    -call as needed, 2600 during weekdays till 5pm and call GI service after 5pm and on weekends [phone]  -Follow up with our [facility] located at [address] [place] [address]. Phone Number: [phone]

## 2021-01-28 NOTE — PROGRESS NOTE ADULT - SUBJECTIVE AND OBJECTIVE BOX
Gastroenterology progress note:     Patient is a 89y old  Male who presents with a chief complaint of Hypoxia (28 Jan 2021 09:55)       Admitted on: 01-26-21    We are following the patient for melena      Interval History: no melena overnight, tolerating clear liquid diet. patient was in atrial flutter last night.     Patient's medical problems are stable    Prior records reviewed (Y/N): Y  History obtained from someone other than patient (Y/N): N      PAST MEDICAL & SURGICAL HISTORY:  Peptic ulcer    Type 2 diabetes mellitus    Hypertension        MEDICATIONS  (STANDING):  chlorhexidine 4% Liquid 1 Application(s) Topical <User Schedule>  metoprolol tartrate 50 milliGRAM(s) Oral two times a day  pantoprazole    Tablet 40 milliGRAM(s) Oral two times a day    MEDICATIONS  (PRN):      Allergies  No Known Allergies      Review of Systems:   General: no fever  HEENT: no hemoptysis  Cardiovascular:  No Chest Pain, +Palpitations  Respiratory:  No Cough, No Dyspnea  Gastrointestinal:  As described in HPI  Hematology: no bruising or hematoma   Neurology: no new motor deficit  Skin: no new rash    Physical Examination:  T(C): 37.4 (01-28-21 @ 05:50), Max: 37.4 (01-28-21 @ 05:50)  HR: 98 (01-28-21 @ 05:50) (75 - 107)  BP: 132/71 (01-28-21 @ 05:50) (112/58 - 164/86)  RR: 18 (01-28-21 @ 05:50) (18 - 18)  SpO2: --      01-27-21 @ 07:01  -  01-28-21 @ 07:00  --------------------------------------------------------  IN: 990 mL / OUT: 1125 mL / NET: -135 mL    01-28-21 @ 07:01  -  01-28-21 @ 12:07  --------------------------------------------------------  IN: 350 mL / OUT: 100 mL / NET: 250 mL        Constitutional: No acute distress.  Head: normocephalic  Neck: no palpable thyroid  Eyes: EOMI  Respiratory:  No signs of respiratory distress. Lung sounds are clear bilaterally.  Cardiovascular:  S1 S2, Regular rate and rhythm.  Abdominal: Abdomen is soft, symmetric, and non-tender without distention.    Extremities: no pitting edema  Skin: No rashes, No Jaundice.        Data: (reviewed by attending)                        8.6    11.89 )-----------( 253      ( 28 Jan 2021 06:29 )             25.8     Hgb trend:  8.6  01-28-21 @ 06:29  9.3  01-27-21 @ 06:32  8.6  01-26-21 @ 23:10  7.5  01-26-21 @ 11:59  7.5  01-26-21 @ 11:20  4.2  01-25-21 @ 16:33        01-28    140  |  105  |  25<H>  ----------------------------<  119<H>  3.6   |  25  |  1.8<H>    Ca    7.8<L>      28 Jan 2021 06:29  Mg     1.9     01-28    TPro  4.7<L>  /  Alb  2.9<L>  /  TBili  1.4<H>  /  DBili  x   /  AST  19  /  ALT  10  /  AlkPhos  66  01-28    Liver panel trend:  TBili 1.4   /   AST 19   /   ALT 10   /   AlkP 66   /   Tptn 4.7   /   Alb 2.9    /   DBili --      01-28  TBili 1.7   /   AST 27   /   ALT 10   /   AlkP 62   /   Tptn 5.2   /   Alb 3.4    /   DBili --      01-27  TBili 1.4   /   AST 20   /   ALT 9   /   AlkP 60   /   Tptn 5.2   /   Alb 3.4    /   DBili --      01-26  TBili 0.2   /   AST 21   /   ALT 8   /   AlkP 58   /   Tptn 5.2   /   Alb 3.5    /   DBili --      01-25       Radiology: (reviewed by attending)  no new

## 2021-01-28 NOTE — DISCHARGE NOTE PROVIDER - NSDCCPCAREPLAN_GEN_ALL_CORE_FT
PRINCIPAL DISCHARGE DIAGNOSIS  Diagnosis: UGIB (upper gastrointestinal bleed)  Assessment and Plan of Treatment: You received an upper endoscopy which showed the presence of nonbleeding ulcers. It is very likely your dark stool is due to intermittent bleeding from these ulcers. It is very important for you to stop taking Aspirin or any NSAIDS including Advil, Motrin, or Ibuprofen. You should also decrease and eliminate the use of caffeine and spicy foods as they can irritate your stomach lining. It is also imperative that you begin or continue taking PPIs to reduce the amount of acid production in your stomach. Please follow up with your PMD  and GI doctor for continued monitoring of symptoms.        SECONDARY DISCHARGE DIAGNOSES  Diagnosis: Sinus tachycardia  Assessment and Plan of Treatment: You were found to have a fast heart rate while in the hospital. We started you on medication to control your heart rate. Please take it as prescribed in this paperwork.

## 2021-01-28 NOTE — DISCHARGE NOTE PROVIDER - CARE PROVIDERS DIRECT ADDRESSES
[email or web address]

## 2021-01-28 NOTE — DISCHARGE NOTE PROVIDER - HOSPITAL COURSE
89 year old male with PMHx of Peptic ulcer disease (>60 years ago), HTN, DM2 (not on any meds), iron deficiency anemia sent in due to hypoxia. Patient lives alone, daughter last reached out to him Friday last, could not reach him since, so today told friend to check on him, they got to the house, patient was too weak could not open door chain, so they called EMS. EMS found patient to be hypoxic to the 80s, he was complaining of feeling weak.   ROS positive for abdominal discomfort patient has been having for a few week, was scheduled for Endoscopy at [facility] this Saturday. Denies fever, chills, blood in stools, emesis.  In ED patient stable on nasal cannula, tachy to 130s. Found to have Hbg 4.2. TEDDY negative, but then patient had melanotic bowel movement.     Patient admitted to medicine with working dx of UGIB.  Xfused 3u PRBC  EGD demonstrated bleeding ulcers, started protonix drip.  Patient found with persistent sinus tach, started lopressor.  Hgb stable, HR stable, patient discharged to home with GI follow up.

## 2021-01-28 NOTE — DISCHARGE NOTE PROVIDER - PROVIDER TOKENS
PROVIDER:[TOKEN:[[id number]],FOLLOWUP:[2 weeks]],FREE:[LAST:[Christina ESQUIVEL],FIRST:[Primary Care Doctor],PHONE:[phone]],FAX:[(   )    -],FOLLOWUP:[Routine]]

## 2021-01-28 NOTE — DISCHARGE NOTE PROVIDER - NSDCMRMEDTOKEN_GEN_ALL_CORE_FT
ferrous sulfate 325 mg (65 mg elemental iron) oral delayed release tablet: 1 tab(s) orally once a day   metoprolol tartrate 50 mg oral tablet: 1 tab(s) orally 2 times a day  pantoprazole 40 mg oral delayed release tablet: 1 tab(s) orally 2 times a day

## 2021-01-28 NOTE — PROGRESS NOTE ADULT - SUBJECTIVE AND OBJECTIVE BOX
DAILY PROGRESS NOTE  ===========================================================    Patient Information:  NINI LANIER  /  89yo  /  Male  /  MRN#: [id number]    Hospital Day: 2d     |:::::::::::::::::::::::::::| SUBJECTIVE |:::::::::::::::::::::::::::|    OVERNIGHT EVENTS: None  TODAY: Patient was seen today at bedside. Review of systems is negative.    |:::::::::::::::::::::::::::| OBJECTIVE |:::::::::::::::::::::::::::|    VITAL SIGNS: Last 24 Hours  Vital Signs Last 24 Hrs  T(C): 37.4 (28 Jan 2021 05:50), Max: 37.4 (28 Jan 2021 05:50)  T(F): 99.4 (28 Jan 2021 05:50), Max: 99.4 (28 Jan 2021 05:50)  HR: 98 (28 Jan 2021 05:50) (75 - 110)  BP: 132/71 (28 Jan 2021 05:50) (112/58 - 164/86)  BP(mean): --  RR: 18 (28 Jan 2021 05:50) (18 - 18)  SpO2: --    I&O's Summary    27 Jan 2021 07:01  -  28 Jan 2021 07:00  --------------------------------------------------------  IN: 990 mL / OUT: 1125 mL / NET: -135 mL    28 Jan 2021 07:01  -  28 Jan 2021 09:38  --------------------------------------------------------  IN: 350 mL / OUT: 100 mL / NET: 250 mL      MEDICATIONS:  MEDICATIONS  (STANDING):  chlorhexidine 4% Liquid 1 Application(s) Topical <User Schedule>  metoprolol tartrate 25 milliGRAM(s) Oral two times a day  pantoprazole Infusion 8 mG/Hr (10 mL/Hr) IV Continuous <Continuous>    MEDICATIONS  (PRN):      PHYSICAL EXAM:  GENERAL: A&Ox3, NAD, alert  HEENT:  Conjunctivae pink, sclera anicteric, oral mucosa moist   CARDIO: Regular rate & rhythm, no murmurs, rubs or gallops                        8.6    11.89 )-----------( 253      ( 28 Jan 2021 06:29 )             25.8   RESP:   No rales, wheezing, or rhonchi appreciated  GI: Soft nontender, nondistended, + bowel sounds   EXT: 2+ Peripheral pulses, no b/l edema   SKIN: No rashes or lesions    LAB RESULTS:                        8.6    11.89 )-----------( 253      ( 28 Jan 2021 06:29 )             25.8     01-28    140  |  105  |  25<H>  ----------------------------<  119<H>  3.6   |  25  |  1.8<H>    Ca    7.8<L>      28 Jan 2021 06:29  Mg     1.9     01-28    TPro  4.7<L>  /  Alb  2.9<L>  /  TBili  1.4<H>  /  DBili  x   /  AST  19  /  ALT  10  /  AlkPhos  66  01-28      CAPILLARY BLOOD GLUCOSE  POCT Blood Glucose.: 141 mg/dL (28 Jan 2021 07:34)  POCT Blood Glucose.: 140 mg/dL (27 Jan 2021 14:41)  A1C with Estimated Average Glucose (01.27.21 @ 11:00) A1C with Estimated Average Glucose Result: 5.6: Method: Immunoassay     Troponin T, Serum (01.27.21 @ 18:34) Troponin T, Serum: 0.04: Critical value: ng/mL   Serum Pro-Brain Natriuretic Peptide (01.25.21 @ 16:33) Serum Pro-Brain Natriuretic Peptide: 3274 pg/mL     MICROBIOLOGY:    RADIOLOGY:    < from: EGD (01.26.21 @ 15:15) >  Impressions:    Normal mucosa in the whole esophagus.    Erosions in the antrum compatible with erosive gastritis.    -No evidence of bleeding noted in stomach. .    -At the exit of duodenal bulb a 2cm clean based ulcer with raised edematous and  erythematous margins was noted on the right wall. No active bleeding or high  risk stigmata noted. Injected Epinephrine on four walls. Also on the left wall a  1 cm clean based ulcer noted with no active bleeding or high risk stigmata of  bleeding. .    -Second portion of duodenum appeared normal. .     < end of copied text >    < from: CT Angio Chest PE Protocol w/ IV Cont (01.26.21 @ 00:33) >  Chest:  No evidence for acute pulmonary embolus.  No CT evidence of pneumonia.  Mild areas of interlobular septal thickening may reflect mild pulmonary edema.  Upper lobe predominant centrilobular emphysema.    Abdomen:  Questionable small outpouching at the proximal duodenum with wall thickening versus volume averaging with adjacent motion artifact. Small inflamed duodenal ulcer is not excluded.  Atrophic left kidney with heterogeneous enhancement and mild fullness of the distal left ureter. Please correlate with urinalysis to exclude left-sided urinary infection/pyelonephritis.    < end of copied text >  < from: Xray Chest 1 View- PORTABLE-Urgent (01.25.21 @ 17:47) >    No consolidation, effusion or pneumothorax.    < end of copied text >    ALLERGIES: No known drug, environmental, or food allergies.     ===========================================================      Assessment and Plan:   · Assessment	  89yo M, from home, with PMHx of PUD, T2DM, Fe deficiency anemia, presents for abdominal pain.     # Upper GI Bleed  - EGD (1/26): Duodenal ulcers w/ clean bases, no active bleeding  - As per GI:  - C/w Protonix drip for today 1/27  - Avoid NSAIDs  - Check H. pylori serologies, treat if positive  - Advance Diet   - Hgb: 4.2 >> 7.5 (s/p 3U pRBC) >> 7.5 >> 8.6 (s/p 1U pRBC) >> 9.3 >> 8.6     # New Onset Afib/Aflutter  - Troponins: .05 >> .07 >> .06 >> .04 (1/27)  - CHADS-VASc = 3 (Age, and questionable hx of HTN/ DM) HAS-BLED = 2  - EKG (1/25): Atrial flutter w/ 2:1 block, lateral infarct (age undetermined), ST&T wave abnormalities (consider inferior ischemia)   - EKG (1/27): AV block, sinus tachycardia, premature atrial complexes, nonspecific T-wave abnormality  - Start on Metoprolol 25mg q12  - As per PT:   - High risk for falls, recommends dispo home w/ PT or w/ assistance  - As per GI:  - Recommends Heparin if AC needed (risks may outweigh benefits given pt's GI bleed & advanced age)  - F/u Cardiology recs  - F/u TTE, TSH    # HTN  - Pt reports no home medications; bp is WNL  - Increase Metoprolol from 25 to 50mg q12     # Hypoxia - Resolved  - 1/26: EMS found pt sating at 80%, sat 99% on 3L NC in ED  - NC was d/c morning 1/28  - Pt sating 100% on RA    # ANISH - Likely from hypoperfusion secondary to anemia  - Cr: 2.2 >> 1.9 >> 1.8 >> 1.8 (1/28)  - BUN: 63 >>49 >> 32 >>25 (1/28)    # DM  - Pt not on any home medications  - FS:   - A1c (1/27): 5.6%    DVT PPx: None   GI PPX: Pantoprazole Infusion  Diet: Clear liquid diet   Activity: IAT (utilizes a RW at baseline, PT recommends dispo home w/ PT or w/ assist)  Dispo: Home  Code Status: DNR     DAILY PROGRESS NOTE  ===========================================================    Patient Information:  NINI LANIER  /  89yo  /  Male  /  MRN#: [id number]    Hospital Day: 2d     |:::::::::::::::::::::::::::| SUBJECTIVE |:::::::::::::::::::::::::::|    OVERNIGHT EVENTS: None  TODAY: Patient was seen today at bedside. Review of systems is negative.    |:::::::::::::::::::::::::::| OBJECTIVE |:::::::::::::::::::::::::::|    VITAL SIGNS: Last 24 Hours  Vital Signs Last 24 Hrs  T(C): 37.4 (28 Jan 2021 05:50), Max: 37.4 (28 Jan 2021 05:50)  T(F): 99.4 (28 Jan 2021 05:50), Max: 99.4 (28 Jan 2021 05:50)  HR: 98 (28 Jan 2021 05:50) (75 - 110)  BP: 132/71 (28 Jan 2021 05:50) (112/58 - 164/86)  BP(mean): --  RR: 18 (28 Jan 2021 05:50) (18 - 18)  SpO2: --    I&O's Summary    27 Jan 2021 07:01  -  28 Jan 2021 07:00  --------------------------------------------------------  IN: 990 mL / OUT: 1125 mL / NET: -135 mL    28 Jan 2021 07:01  -  28 Jan 2021 09:38  --------------------------------------------------------  IN: 350 mL / OUT: 100 mL / NET: 250 mL      MEDICATIONS:  MEDICATIONS  (STANDING):  chlorhexidine 4% Liquid 1 Application(s) Topical <User Schedule>  metoprolol tartrate 50 milliGRAM(s) Oral two times a day  pantoprazole    Tablet 40 milliGRAM(s) Oral two times a day    MEDICATIONS  (PRN):      PHYSICAL EXAM:  GENERAL: A&Ox3, NAD, alert  HEENT:  Conjunctivae pink, sclera anicteric, oral mucosa moist   CARDIO: Regular rate & rhythm, no murmurs, rubs or gallops                        8.6    11.89 )-----------( 253      ( 28 Jan 2021 06:29 )             25.8   RESP:   No rales, wheezing, or rhonchi appreciated  GI: Soft nontender, nondistended, + bowel sounds   EXT: 2+ Peripheral pulses, no b/l edema   SKIN: No rashes or lesions    LAB RESULTS:                        8.6    11.89 )-----------( 253      ( 28 Jan 2021 06:29 )             25.8     01-28    140  |  105  |  25<H>  ----------------------------<  119<H>  3.6   |  25  |  1.8<H>    Ca    7.8<L>      28 Jan 2021 06:29  Mg     1.9     01-28    TPro  4.7<L>  /  Alb  2.9<L>  /  TBili  1.4<H>  /  DBili  x   /  AST  19  /  ALT  10  /  AlkPhos  66  01-28      CAPILLARY BLOOD GLUCOSE  POCT Blood Glucose.: 141 mg/dL (28 Jan 2021 07:34)  POCT Blood Glucose.: 140 mg/dL (27 Jan 2021 14:41)  A1C with Estimated Average Glucose (01.27.21 @ 11:00) A1C with Estimated Average Glucose Result: 5.6: Method: Immunoassay     Troponin T, Serum (01.27.21 @ 18:34) Troponin T, Serum: 0.04: Critical value: ng/mL   Serum Pro-Brain Natriuretic Peptide (01.25.21 @ 16:33) Serum Pro-Brain Natriuretic Peptide: 3274 pg/mL     MICROBIOLOGY:    RADIOLOGY:    < from: EGD (01.26.21 @ 15:15) >  Impressions:    Normal mucosa in the whole esophagus.    Erosions in the antrum compatible with erosive gastritis.    -No evidence of bleeding noted in stomach. .    -At the exit of duodenal bulb a 2cm clean based ulcer with raised edematous and  erythematous margins was noted on the right wall. No active bleeding or high  risk stigmata noted. Injected Epinephrine on four walls. Also on the left wall a  1 cm clean based ulcer noted with no active bleeding or high risk stigmata of  bleeding. .    -Second portion of duodenum appeared normal. .     < end of copied text >    < from: CT Angio Chest PE Protocol w/ IV Cont (01.26.21 @ 00:33) >  Chest:  No evidence for acute pulmonary embolus.  No CT evidence of pneumonia.  Mild areas of interlobular septal thickening may reflect mild pulmonary edema.  Upper lobe predominant centrilobular emphysema.    Abdomen:  Questionable small outpouching at the proximal duodenum with wall thickening versus volume averaging with adjacent motion artifact. Small inflamed duodenal ulcer is not excluded.  Atrophic left kidney with heterogeneous enhancement and mild fullness of the distal left ureter. Please correlate with urinalysis to exclude left-sided urinary infection/pyelonephritis.    < end of copied text >  < from: Xray Chest 1 View- PORTABLE-Urgent (01.25.21 @ 17:47) >    No consolidation, effusion or pneumothorax.    < end of copied text >    ALLERGIES: No known drug, environmental, or food allergies.     ===========================================================      Assessment and Plan:   · Assessment	  89yo M, from home, with PMHx of PUD, T2DM, Fe deficiency anemia, presents for abdominal pain.     ~Pt is stable, anticipate discharge tomorrow 1/29 if rate is controlled for 24h and hgb is stable.    # Upper GI Bleed  - EGD (1/26): Duodenal ulcers w/ clean bases, no active bleeding  - As per GI:  - C/w Protonix drip for today 1/27  - Avoid NSAIDs  - Check H. pylori serologies, treat if positive  - Hgb: 4.2 >> 7.5 (s/p 3U pRBC) >> 7.5 >> 8.6 (s/p 1U pRBC) >> 9.3 >> 8.6     # New Onset Afib/Aflutter  - Troponins: .05 >> .07 >> .06 >> .04 (1/27)  - CHADS-VASc = 3 (Age, and questionable hx of HTN/ DM) HAS-BLED = 2  - EKG (1/25): Atrial flutter w/ 2:1 block, lateral infarct (age undetermined), ST&T wave abnormalities (consider inferior ischemia)   - EKG (1/27): AV block, sinus tachycardia, premature atrial complexes, nonspecific T-wave abnormality  - Start on Metoprolol 25mg q12  - As per PT:   - High risk for falls, recommends dispo home w/ PT or w/ assistance  - As per GI:  - Recommends Heparin if AC needed (Attending discussed w/ daughter and she does NOT want him on blood thinners)  - F/u TTE, TSH    # HTN  - Pt reports no home medications; bp is WNL  - Increase Metoprolol from 25 to 50mg q12     # Hypoxia - Resolved  - 1/26: EMS found pt sating at 80%, sat 99% on 3L NC in ED  - NC was d/c morning 1/28  - Pt sating 100% on RA    # ANISH - Likely from hypoperfusion secondary to anemia  - Cr: 2.2 >> 1.9 >> 1.8 >> 1.8 (1/28)  - BUN: 63 >>49 >> 32 >>25 (1/28)    # DM  - Pt not on any home medications  - FS:   - A1c (1/27): 5.6%    DVT PPx: None   GI PPX: Pantoprazole Infusion  Diet: Mechanical soft  Activity: IAT (utilizes a RW at baseline, PT recommends dispo home w/ PT or w/ assist)  Dispo: Home by daughter  Code Status: DNR     DAILY PROGRESS NOTE  ===========================================================    Patient Information:  NINI LANIER  /  89yo  /  Male  /  MRN#: [id number]    Hospital Day: 2d     |:::::::::::::::::::::::::::| SUBJECTIVE |:::::::::::::::::::::::::::|    OVERNIGHT EVENTS: None  TODAY: Patient was seen today at bedside. Review of systems is negative.    |:::::::::::::::::::::::::::| OBJECTIVE |:::::::::::::::::::::::::::|    VITAL SIGNS: Last 24 Hours  Vital Signs Last 24 Hrs  T(C): 37.4 (28 Jan 2021 05:50), Max: 37.4 (28 Jan 2021 05:50)  T(F): 99.4 (28 Jan 2021 05:50), Max: 99.4 (28 Jan 2021 05:50)  HR: 98 (28 Jan 2021 05:50) (75 - 110)  BP: 132/71 (28 Jan 2021 05:50) (112/58 - 164/86)  BP(mean): --  RR: 18 (28 Jan 2021 05:50) (18 - 18)  SpO2: --    I&O's Summary    27 Jan 2021 07:01  -  28 Jan 2021 07:00  --------------------------------------------------------  IN: 990 mL / OUT: 1125 mL / NET: -135 mL    28 Jan 2021 07:01  -  28 Jan 2021 09:38  --------------------------------------------------------  IN: 350 mL / OUT: 100 mL / NET: 250 mL      MEDICATIONS:  MEDICATIONS  (STANDING):  chlorhexidine 4% Liquid 1 Application(s) Topical <User Schedule>  metoprolol tartrate 50 milliGRAM(s) Oral two times a day  pantoprazole    Tablet 40 milliGRAM(s) Oral two times a day    MEDICATIONS  (PRN):      PHYSICAL EXAM:  GENERAL: A&Ox3, NAD, alert  HEENT:  Conjunctivae pink, sclera anicteric, oral mucosa moist   CARDIO: Regular rate & rhythm, no murmurs, rubs or gallops                        8.6    11.89 )-----------( 253      ( 28 Jan 2021 06:29 )             25.8   RESP:   No rales, wheezing, or rhonchi appreciated  GI: Soft nontender, nondistended, + bowel sounds   EXT: 2+ Peripheral pulses, no b/l edema   SKIN: No rashes or lesions    LAB RESULTS:                        8.6    11.89 )-----------( 253      ( 28 Jan 2021 06:29 )             25.8     01-28    140  |  105  |  25<H>  ----------------------------<  119<H>  3.6   |  25  |  1.8<H>    Ca    7.8<L>      28 Jan 2021 06:29  Mg     1.9     01-28    TPro  4.7<L>  /  Alb  2.9<L>  /  TBili  1.4<H>  /  DBili  x   /  AST  19  /  ALT  10  /  AlkPhos  66  01-28      CAPILLARY BLOOD GLUCOSE  POCT Blood Glucose.: 141 mg/dL (28 Jan 2021 07:34)  POCT Blood Glucose.: 140 mg/dL (27 Jan 2021 14:41)  A1C with Estimated Average Glucose (01.27.21 @ 11:00) A1C with Estimated Average Glucose Result: 5.6: Method: Immunoassay     Troponin T, Serum (01.27.21 @ 18:34) Troponin T, Serum: 0.04: Critical value: ng/mL   Serum Pro-Brain Natriuretic Peptide (01.25.21 @ 16:33) Serum Pro-Brain Natriuretic Peptide: 3274 pg/mL     MICROBIOLOGY:    RADIOLOGY:    < from: EGD (01.26.21 @ 15:15) >  Impressions:    Normal mucosa in the whole esophagus.    Erosions in the antrum compatible with erosive gastritis.    -No evidence of bleeding noted in stomach. .    -At the exit of duodenal bulb a 2cm clean based ulcer with raised edematous and  erythematous margins was noted on the right wall. No active bleeding or high  risk stigmata noted. Injected Epinephrine on four walls. Also on the left wall a  1 cm clean based ulcer noted with no active bleeding or high risk stigmata of  bleeding. .    -Second portion of duodenum appeared normal. .     < end of copied text >    < from: CT Angio Chest PE Protocol w/ IV Cont (01.26.21 @ 00:33) >  Chest:  No evidence for acute pulmonary embolus.  No CT evidence of pneumonia.  Mild areas of interlobular septal thickening may reflect mild pulmonary edema.  Upper lobe predominant centrilobular emphysema.    Abdomen:  Questionable small outpouching at the proximal duodenum with wall thickening versus volume averaging with adjacent motion artifact. Small inflamed duodenal ulcer is not excluded.  Atrophic left kidney with heterogeneous enhancement and mild fullness of the distal left ureter. Please correlate with urinalysis to exclude left-sided urinary infection/pyelonephritis.    < end of copied text >  < from: Xray Chest 1 View- PORTABLE-Urgent (01.25.21 @ 17:47) >    No consolidation, effusion or pneumothorax.    < end of copied text >    ALLERGIES: No known drug, environmental, or food allergies.     ===========================================================      Assessment and Plan:   · Assessment	  89yo M, from home, with PMHx of PUD, T2DM, Fe deficiency anemia, presents for abdominal pain.     ~Pt is stable, anticipate discharge tomorrow 1/29 if rate is controlled for 24h and hgb is stable.    # Upper GI Bleed  - EGD (1/26): Duodenal ulcers w/ clean bases, no active bleeding  - As per GI:  - C/w Protonix drip for today 1/27  - Avoid NSAIDs  - Check H. pylori serologies, treat if positive  - Hgb: 4.2 >> 7.5 (s/p 3U pRBC) >> 7.5 >> 8.6 (s/p 1U pRBC) >> 9.3 >> 8.6 (1/28)    # New Onset Afib/Aflutter  - Troponins: .05 >> .07 >> .06 >> .04 (1/27)  - CHADS-VASc = 3 (Age, and questionable hx of HTN/ DM) HAS-BLED = 2  - EKG (1/25): Atrial flutter w/ 2:1 block, lateral infarct (age undetermined), ST&T wave abnormalities (consider inferior ischemia)   - EKG (1/27): AV block, sinus tachycardia, premature atrial complexes, nonspecific T-wave abnormality  - As per PT:   - High risk for falls, recommends dispo home w/ PT or w/ assistance  - As per GI:  - Recommends Heparin if AC needed (Attending discussed risks & benefits w/ daughter and she does NOT want him on blood thinners)  - Increase Metoprolol from 25 to 50mg q12   - F/u TSH    # HTN  - Pt reports no home medications; bp is WNL    # Hypoxia - Resolved  - 1/26: EMS found pt sating at 80%, sat 99% on 3L NC in ED  - NC was d/c morning 1/28  - Pt sating 100% on RA    # ANISH - Likely from hypoperfusion secondary to anemia  - Cr: 2.2 >> 1.9 >> 1.8 >> 1.8 (1/28)  - BUN: 63 >>49 >> 32 >>25 (1/28)    # DM  - Pt not on any home medications  - FS:   - A1c (1/27): 5.6%    DVT PPx: None   GI PPX: Pantoprazole Infusion  Diet: Mechanical soft  Activity: IAT (utilizes a RW at baseline, PT recommends dispo home w/ PT or w/ assist)  Dispo: Home by daughter  Code Status: DNR     DAILY PROGRESS NOTE  ===========================================================    Patient Information:  NINI LANIER  /  89yo  /  Male  /  MRN#: [id number]    Hospital Day: 2d     |:::::::::::::::::::::::::::| SUBJECTIVE |:::::::::::::::::::::::::::|    OVERNIGHT EVENTS: None  TODAY: Patient was seen today at bedside. Review of systems is negative.    |:::::::::::::::::::::::::::| OBJECTIVE |:::::::::::::::::::::::::::|    VITAL SIGNS: Last 24 Hours  Vital Signs Last 24 Hrs  T(C): 37.4 (28 Jan 2021 05:50), Max: 37.4 (28 Jan 2021 05:50)  T(F): 99.4 (28 Jan 2021 05:50), Max: 99.4 (28 Jan 2021 05:50)  HR: 98 (28 Jan 2021 05:50) (75 - 110)  BP: 132/71 (28 Jan 2021 05:50) (112/58 - 164/86)  BP(mean): --  RR: 18 (28 Jan 2021 05:50) (18 - 18)  SpO2: --    I&O's Summary    27 Jan 2021 07:01  -  28 Jan 2021 07:00  --------------------------------------------------------  IN: 990 mL / OUT: 1125 mL / NET: -135 mL    28 Jan 2021 07:01  -  28 Jan 2021 09:38  --------------------------------------------------------  IN: 350 mL / OUT: 100 mL / NET: 250 mL      MEDICATIONS:  MEDICATIONS  (STANDING):  chlorhexidine 4% Liquid 1 Application(s) Topical <User Schedule>  metoprolol tartrate 50 milliGRAM(s) Oral two times a day  pantoprazole    Tablet 40 milliGRAM(s) Oral two times a day    MEDICATIONS  (PRN):      PHYSICAL EXAM:  GENERAL: A&Ox3, NAD, alert  HEENT:  Conjunctivae pink, sclera anicteric, oral mucosa moist   CARDIO: Regular rate & rhythm, no murmurs, rubs or gallops                        8.6    11.89 )-----------( 253      ( 28 Jan 2021 06:29 )             25.8   RESP:   No rales, wheezing, or rhonchi appreciated  GI: Soft nontender, nondistended, + bowel sounds   EXT: 2+ Peripheral pulses, no b/l edema   SKIN: No rashes or lesions    LAB RESULTS:                        8.6    11.89 )-----------( 253      ( 28 Jan 2021 06:29 )             25.8     01-28    140  |  105  |  25<H>  ----------------------------<  119<H>  3.6   |  25  |  1.8<H>    Ca    7.8<L>      28 Jan 2021 06:29  Mg     1.9     01-28    TPro  4.7<L>  /  Alb  2.9<L>  /  TBili  1.4<H>  /  DBili  x   /  AST  19  /  ALT  10  /  AlkPhos  66  01-28      CAPILLARY BLOOD GLUCOSE  POCT Blood Glucose.: 141 mg/dL (28 Jan 2021 07:34)  POCT Blood Glucose.: 140 mg/dL (27 Jan 2021 14:41)  A1C with Estimated Average Glucose (01.27.21 @ 11:00) A1C with Estimated Average Glucose Result: 5.6: Method: Immunoassay     Troponin T, Serum (01.27.21 @ 18:34) Troponin T, Serum: 0.04: Critical value: ng/mL   Serum Pro-Brain Natriuretic Peptide (01.25.21 @ 16:33) Serum Pro-Brain Natriuretic Peptide: 3274 pg/mL     MICROBIOLOGY:    RADIOLOGY:    < from: EGD (01.26.21 @ 15:15) >  Impressions:    Normal mucosa in the whole esophagus.    Erosions in the antrum compatible with erosive gastritis.    -No evidence of bleeding noted in stomach. .    -At the exit of duodenal bulb a 2cm clean based ulcer with raised edematous and  erythematous margins was noted on the right wall. No active bleeding or high  risk stigmata noted. Injected Epinephrine on four walls. Also on the left wall a  1 cm clean based ulcer noted with no active bleeding or high risk stigmata of  bleeding. .    -Second portion of duodenum appeared normal. .     < end of copied text >    < from: CT Angio Chest PE Protocol w/ IV Cont (01.26.21 @ 00:33) >  Chest:  No evidence for acute pulmonary embolus.  No CT evidence of pneumonia.  Mild areas of interlobular septal thickening may reflect mild pulmonary edema.  Upper lobe predominant centrilobular emphysema.    Abdomen:  Questionable small outpouching at the proximal duodenum with wall thickening versus volume averaging with adjacent motion artifact. Small inflamed duodenal ulcer is not excluded.  Atrophic left kidney with heterogeneous enhancement and mild fullness of the distal left ureter. Please correlate with urinalysis to exclude left-sided urinary infection/pyelonephritis.    < end of copied text >  < from: Xray Chest 1 View- PORTABLE-Urgent (01.25.21 @ 17:47) >    No consolidation, effusion or pneumothorax.    < end of copied text >    ALLERGIES: No known drug, environmental, or food allergies.     ===========================================================      Assessment and Plan:   · Assessment	  89yo M, from home, with PMHx of PUD, T2DM, Fe deficiency anemia, presents for abdominal pain.     ~Pt is stable, anticipate discharge tomorrow 1/29 if rate is controlled for 24h and hgb is stable.    # Upper GI Bleed  - EGD (1/26): Duodenal ulcers w/ clean bases, no active bleeding  - As per GI:  - C/w Protonix drip for today 1/27  - Avoid NSAIDs  - Check H. pylori serologies, treat if positive  - Hgb: 4.2 >> 7.5 (s/p 3U pRBC) >> 7.5 >> 8.6 (s/p 1U pRBC) >> 9.3 >> 8.6 (1/28)    # New Onset Afib/Aflutter  - Troponins: .05 >> .07 >> .06 >> .04 (1/27)  - CHADS-VASc = 3 (Age, and questionable hx of HTN/ DM) HAS-BLED = 2  - EKG (1/25): Atrial flutter w/ 2:1 block, lateral infarct (age undetermined), ST&T wave abnormalities (consider inferior ischemia)   - EKG (1/27): AV block, sinus tachycardia, premature atrial complexes, nonspecific T-wave abnormality  - As per PT:   - High risk for falls, recommends dispo home w/ PT or w/ assistance  - As per GI:  - Recommends Heparin if AC needed (Attending discussed risks & benefits w/ daughter and she does NOT want him on blood thinners)  - Increase Metoprolol from 25 to 50mg q12   - F/u TSH    # HTN  - Pt reports no home medications; bp is WNL    # Hypoxia - Resolved  - 1/26: EMS found pt sating at 80%, sat 99% on 3L NC in ED  - NC was d/c morning 1/28  - Pt sating 100% on RA    # ANISH - Likely from hypoperfusion secondary to anemia  - Cr: 2.2 >> 1.9 >> 1.8 >> 1.8 (1/28)  - BUN: 63 >>49 >> 32 >>25 (1/28)    # DM  - Pt not on any home medications  - FS:   - A1c (1/27): 5.6%    DVT PPx: None   GI PPX: Pantoprazole Infusion  Diet: Mechanical soft  Activity: IAT (utilizes a RW at baseline, PT recommends dispo home w/ PT or w/ assist)  Dispo: Home by daughter  Code Status: DNR  Edited and signed by Sreekanth Pacheco MD, PGY-1

## 2021-01-28 NOTE — DISCHARGE NOTE NURSING/CASE MANAGEMENT/SOCIAL WORK - PATIENT PORTAL LINK FT
You can access the FollowMyHealth Patient Portal offered by [facility] by registering at the following website: [email or web address]. By joining [employer]’s FollowMyHealth portal, you will also be able to view your health information using other applications (apps) compatible with our system.

## 2021-01-29 VITALS
TEMPERATURE: 98 F | SYSTOLIC BLOOD PRESSURE: 127 MMHG | RESPIRATION RATE: 20 BRPM | HEART RATE: 111 BPM | DIASTOLIC BLOOD PRESSURE: 57 MMHG

## 2021-01-29 LAB
ALBUMIN SERPL ELPH-MCNC: 3.1 G/DL — LOW (ref 3.5–5.2)
ALP SERPL-CCNC: 58 U/L — SIGNIFICANT CHANGE UP (ref 30–115)
ALT FLD-CCNC: 9 U/L — SIGNIFICANT CHANGE UP (ref 0–41)
ANION GAP SERPL CALC-SCNC: 8 MMOL/L — SIGNIFICANT CHANGE UP (ref 7–14)
AST SERPL-CCNC: 15 U/L — SIGNIFICANT CHANGE UP (ref 0–41)
BASOPHILS # BLD AUTO: 0.02 K/UL — SIGNIFICANT CHANGE UP (ref 0–0.2)
BASOPHILS NFR BLD AUTO: 0.2 % — SIGNIFICANT CHANGE UP (ref 0–1)
BILIRUB SERPL-MCNC: 0.8 MG/DL — SIGNIFICANT CHANGE UP (ref 0.2–1.2)
BUN SERPL-MCNC: 28 MG/DL — HIGH (ref 10–20)
CALCIUM SERPL-MCNC: 7.7 MG/DL — LOW (ref 8.5–10.1)
CHLORIDE SERPL-SCNC: 104 MMOL/L — SIGNIFICANT CHANGE UP (ref 98–110)
CO2 SERPL-SCNC: 24 MMOL/L — SIGNIFICANT CHANGE UP (ref 17–32)
CREAT SERPL-MCNC: 1.8 MG/DL — HIGH (ref 0.7–1.5)
EOSINOPHIL # BLD AUTO: 0.23 K/UL — SIGNIFICANT CHANGE UP (ref 0–0.7)
EOSINOPHIL NFR BLD AUTO: 2 % — SIGNIFICANT CHANGE UP (ref 0–8)
GLUCOSE BLDC GLUCOMTR-MCNC: 133 MG/DL — HIGH (ref 70–99)
GLUCOSE BLDC GLUCOMTR-MCNC: 152 MG/DL — HIGH (ref 70–99)
GLUCOSE SERPL-MCNC: 140 MG/DL — HIGH (ref 70–99)
HCT VFR BLD CALC: 25.1 % — LOW (ref 42–52)
HCT VFR BLD CALC: 27.4 % — LOW (ref 42–52)
HGB BLD-MCNC: 8.2 G/DL — LOW (ref 14–18)
HGB BLD-MCNC: 8.7 G/DL — LOW (ref 14–18)
IMM GRANULOCYTES NFR BLD AUTO: 1.2 % — HIGH (ref 0.1–0.3)
LYMPHOCYTES # BLD AUTO: 0.28 K/UL — LOW (ref 1.2–3.4)
LYMPHOCYTES # BLD AUTO: 2.5 % — LOW (ref 20.5–51.1)
MAGNESIUM SERPL-MCNC: 1.9 MG/DL — SIGNIFICANT CHANGE UP (ref 1.8–2.4)
MCHC RBC-ENTMCNC: 27 PG — SIGNIFICANT CHANGE UP (ref 27–31)
MCHC RBC-ENTMCNC: 27.5 PG — SIGNIFICANT CHANGE UP (ref 27–31)
MCHC RBC-ENTMCNC: 31.8 G/DL — LOW (ref 32–37)
MCHC RBC-ENTMCNC: 32.7 G/DL — SIGNIFICANT CHANGE UP (ref 32–37)
MCV RBC AUTO: 84.2 FL — SIGNIFICANT CHANGE UP (ref 80–94)
MCV RBC AUTO: 85.1 FL — SIGNIFICANT CHANGE UP (ref 80–94)
MONOCYTES # BLD AUTO: 1.27 K/UL — HIGH (ref 0.1–0.6)
MONOCYTES NFR BLD AUTO: 11.2 % — HIGH (ref 1.7–9.3)
NEUTROPHILS # BLD AUTO: 9.37 K/UL — HIGH (ref 1.4–6.5)
NEUTROPHILS NFR BLD AUTO: 82.9 % — HIGH (ref 42.2–75.2)
NRBC # BLD: 0 /100 WBCS — SIGNIFICANT CHANGE UP (ref 0–0)
NRBC # BLD: 0 /100 WBCS — SIGNIFICANT CHANGE UP (ref 0–0)
PLATELET # BLD AUTO: 239 K/UL — SIGNIFICANT CHANGE UP (ref 130–400)
PLATELET # BLD AUTO: 276 K/UL — SIGNIFICANT CHANGE UP (ref 130–400)
POTASSIUM SERPL-MCNC: 4 MMOL/L — SIGNIFICANT CHANGE UP (ref 3.5–5)
POTASSIUM SERPL-SCNC: 4 MMOL/L — SIGNIFICANT CHANGE UP (ref 3.5–5)
PROT SERPL-MCNC: 4.7 G/DL — LOW (ref 6–8)
RBC # BLD: 2.98 M/UL — LOW (ref 4.7–6.1)
RBC # BLD: 3.22 M/UL — LOW (ref 4.7–6.1)
RBC # FLD: 19.1 % — HIGH (ref 11.5–14.5)
RBC # FLD: 19.2 % — HIGH (ref 11.5–14.5)
SODIUM SERPL-SCNC: 136 MMOL/L — SIGNIFICANT CHANGE UP (ref 135–146)
T4 FREE SERPL-MCNC: 1.4 NG/DL — SIGNIFICANT CHANGE UP (ref 0.9–1.8)
TSH SERPL-MCNC: 2.11 UIU/ML — SIGNIFICANT CHANGE UP (ref 0.27–4.2)
WBC # BLD: 11.31 K/UL — HIGH (ref 4.8–10.8)
WBC # BLD: 11.33 K/UL — HIGH (ref 4.8–10.8)
WBC # FLD AUTO: 11.31 K/UL — HIGH (ref 4.8–10.8)
WBC # FLD AUTO: 11.33 K/UL — HIGH (ref 4.8–10.8)

## 2021-01-29 PROCEDURE — 99239 HOSP IP/OBS DSCHRG MGMT >30: CPT

## 2021-01-29 RX ORDER — FERROUS SULFATE 325(65) MG
1 TABLET ORAL
Qty: 0 | Refills: 0 | DISCHARGE

## 2021-01-29 RX ORDER — PANTOPRAZOLE SODIUM 20 MG/1
1 TABLET, DELAYED RELEASE ORAL
Qty: 0 | Refills: 0 | DISCHARGE
Start: 2021-01-29

## 2021-01-29 RX ORDER — METOPROLOL TARTRATE 50 MG
1 TABLET ORAL
Qty: 60 | Refills: 0
Start: 2021-01-29 | End: 2021-02-27

## 2021-01-29 RX ORDER — PANTOPRAZOLE SODIUM 20 MG/1
1 TABLET, DELAYED RELEASE ORAL
Qty: 60 | Refills: 0
Start: 2021-01-29 | End: 2021-02-27

## 2021-01-29 RX ORDER — METOPROLOL TARTRATE 50 MG
1 TABLET ORAL
Qty: 0 | Refills: 0 | DISCHARGE
Start: 2021-01-29

## 2021-01-29 RX ADMIN — PANTOPRAZOLE SODIUM 40 MILLIGRAM(S): 20 TABLET, DELAYED RELEASE ORAL at 16:54

## 2021-01-29 RX ADMIN — Medication 50 MILLIGRAM(S): at 16:54

## 2021-01-29 RX ADMIN — Medication 50 MILLIGRAM(S): at 06:13

## 2021-01-29 RX ADMIN — PANTOPRAZOLE SODIUM 40 MILLIGRAM(S): 20 TABLET, DELAYED RELEASE ORAL at 06:13

## 2021-01-29 RX ADMIN — CHLORHEXIDINE GLUCONATE 1 APPLICATION(S): 213 SOLUTION TOPICAL at 06:13

## 2021-01-29 NOTE — PROGRESS NOTE ADULT - PROVIDER SPECIALTY LIST ADULT
Internal Medicine
Gastroenterology
Internal Medicine
Gastroenterology
Hospitalist

## 2021-01-29 NOTE — PROGRESS NOTE ADULT - ASSESSMENT
#Upper GI bleed resolved   hgb stable 8.7 today   tolerating diet   H pylori serologies neg   s/p EGD < from: EGD (01.26.21 @ 15:15) > Impressions:  Normal mucosa in the whole esophagus.  Erosions in the antrum compatible with erosive gastritis. No evidence of bleeding noted in stomach. At the exit of duodenal bulb a 2cm clean based ulcer with raised edematous and erythematous margins was noted on the right wall. No active bleeding or high risk stigmata noted. Injected Epinephrine on four walls. Also on the left wall a 1 cm clean based ulcer noted with no active bleeding or high risk stigmata of bleeding. Second portion of duodenum appeared normal.     # aflutter on initial EKG CHADSVAC is probably 1 or 2 for age and possible htn was not on any meds at home for htn and BP on low side now does not seem like he has DM as A1c not in diabetic range   patient in NSR now on tele   given advanced age, GI bleed as above requiring 4 units of PRBC from PUD and and also seen by PT deemed high risk for falls. risks of anticoagulation outweigh benefits. I spoke to daughter and patient   about anticoagulation patient and daughter  do  not want any kind of anticoagulation patient also told me that he is unsteady on his feet as well.   no need for echo as it will not      #leukocytosis no signs of infection. improved     #ANISH in the setting of anemia and GI improving likely has underlying unknown stage of CKD 3      discharge today   spent 35 min on discharge

## 2021-01-29 NOTE — PROGRESS NOTE ADULT - SUBJECTIVE AND OBJECTIVE BOX
no chest pain   no sob   doing well     Vital Signs Last 24 Hrs  T(C): 36.4 (29 Jan 2021 13:00), Max: 36.5 (28 Jan 2021 20:10)  T(F): 97.6 (29 Jan 2021 13:00), Max: 97.7 (28 Jan 2021 20:10)  HR: 111 (29 Jan 2021 13:00) (96 - 126)  BP: 127/57 (29 Jan 2021 13:00) (90/48 - 141/60)  BP(mean): --  RR: 20 (29 Jan 2021 13:00) (18 - 20)  SpO2: 100% (29 Jan 2021 09:00) (98% - 100%)    PHYSICAL EXAM:  GENERAL: NAD,   CHEST/LUNG: Clear to auscultation bilaterally; No wheeze  HEART: Regular rate and rhythm; No murmurs, rubs, or gallops  ABDOMEN: Soft, Nontender, Nondistended; Bowel sounds present  EXTREMITIES:  2+ Peripheral Pulses, No clubbing, cyanosis, or edema  PSYCH: AAOx3  NEUROLOGY: non-focal  SKIN: No rashes or lesions                          8.7    11.33 )-----------( 276      ( 29 Jan 2021 11:19 )             27.4     01-29    136  |  104  |  28<H>  ----------------------------<  140<H>  4.0   |  24  |  1.8<H>    Ca    7.7<L>      29 Jan 2021 06:29  Mg     1.9     01-29    TPro  4.7<L>  /  Alb  3.1<L>  /  TBili  0.8  /  DBili  x   /  AST  15  /  ALT  9   /  AlkPhos  58  01-29    LIVER FUNCTIONS - ( 29 Jan 2021 06:29 )  Alb: 3.1 g/dL / Pro: 4.7 g/dL / ALK PHOS: 58 U/L / ALT: 9 U/L / AST: 15 U/L / GGT: x             CARDIAC MARKERS ( 27 Jan 2021 18:34 )  x     / 0.04 ng/mL / x     / x     / x

## 2021-01-29 NOTE — CHART NOTE - NSCHARTNOTEFT_GEN_A_CORE
<<<RESIDENT DISCHARGE NOTE>>>     NINI LANIER  MRN-[id number]    VITAL SIGNS:  T(F): 97.6 (01-29-21 @ 13:00), Max: 97.7 (01-28-21 @ 20:10)  HR: 111 (01-29-21 @ 13:00)  BP: 127/57 (01-29-21 @ 13:00)  SpO2: 100% (01-29-21 @ 09:00)      PHYSICAL EXAMINATION:  General: Well appearing, no acute distress  Head & Neck: Normocephalic, atraumatic  Pulmonary: Lungs clear to auscultation bilaterally, no wheezing ronchi, rales  Cardiovascular: Regular rate, normal rhythm, S1&S2 auscultated  Gastrointestinal/Abdomen & Pelvis: Soft, nontender, nondistended, (+) bowel sounds  Neurologic/Motor: CN II-XII grossly intact, AAOx4    TEST RESULTS:                        8.7    11.33 )-----------( 276      ( 29 Jan 2021 11:19 )             27.4       01-29    136  |  104  |  28<H>  ----------------------------<  140<H>  4.0   |  24  |  1.8<H>    Ca    7.7<L>      29 Jan 2021 06:29  Mg     1.9     01-29    TPro  4.7<L>  /  Alb  3.1<L>  /  TBili  0.8  /  DBili  x   /  AST  15  /  ALT  9   /  AlkPhos  58  01-29      FINAL DISCHARGE INTERVIEW:  Resident Present: OBIE Pacheco MD    DISCHARGE MEDICATION RECONCILIATION  reviewed with Attending Kareem GALLARDO    DISPOSITION:  Home

## 2021-02-04 DIAGNOSIS — N18.4 CHRONIC KIDNEY DISEASE, STAGE 4 (SEVERE): ICD-10-CM

## 2021-02-04 DIAGNOSIS — J43.9 EMPHYSEMA, UNSPECIFIED: ICD-10-CM

## 2021-02-04 DIAGNOSIS — I48.92 UNSPECIFIED ATRIAL FLUTTER: ICD-10-CM

## 2021-02-04 DIAGNOSIS — E11.22 TYPE 2 DIABETES MELLITUS WITH DIABETIC CHRONIC KIDNEY DISEASE: ICD-10-CM

## 2021-02-04 DIAGNOSIS — K29.60 OTHER GASTRITIS WITHOUT BLEEDING: ICD-10-CM

## 2021-02-04 DIAGNOSIS — N17.9 ACUTE KIDNEY FAILURE, UNSPECIFIED: ICD-10-CM

## 2021-02-04 DIAGNOSIS — K92.1 MELENA: ICD-10-CM

## 2021-02-04 DIAGNOSIS — J96.01 ACUTE RESPIRATORY FAILURE WITH HYPOXIA: ICD-10-CM

## 2021-02-04 DIAGNOSIS — I12.9 HYPERTENSIVE CHRONIC KIDNEY DISEASE WITH STAGE 1 THROUGH STAGE 4 CHRONIC KIDNEY DISEASE, OR UNSPECIFIED CHRONIC KIDNEY DISEASE: ICD-10-CM

## 2021-02-04 DIAGNOSIS — D62 ACUTE POSTHEMORRHAGIC ANEMIA: ICD-10-CM

## 2021-02-04 DIAGNOSIS — I48.91 UNSPECIFIED ATRIAL FIBRILLATION: ICD-10-CM

## 2021-02-04 DIAGNOSIS — K26.4 CHRONIC OR UNSPECIFIED DUODENAL ULCER WITH HEMORRHAGE: ICD-10-CM

## 2021-02-04 DIAGNOSIS — D72.829 ELEVATED WHITE BLOOD CELL COUNT, UNSPECIFIED: ICD-10-CM

## 2021-10-21 NOTE — OCCUPATIONAL THERAPY INITIAL EVALUATION ADULT - BALANCE DISTURBANCE, IDENTIFIED IMPAIRMENT CONTRIBUTE, REHAB EVAL
decreased strength Wartpeel Counseling:  I discussed with the patient the risks of Wartpeel including but not limited to erythema, scaling, itching, weeping, crusting, and pain.

## 2024-12-23 NOTE — OCCUPATIONAL THERAPY INITIAL EVALUATION ADULT - LIGHT TOUCH SENSATION, LUE, REHAB EVAL
CC:  Ida Rodriguez is here today for   Chief Complaint   Patient presents with    Office Visit     Pain in finger       PCP Kings Salazar DO   Medications: medications verified, no change    Preferred pharmacy verified:    [facility] [address]   [address]   [place] [address]  Phone: [phone] Fax: [phone]    [facility] [address]  [address]  [place] [address]  Phone: [phone] Fax: [phone]     within normal limits